# Patient Record
Sex: MALE | Race: WHITE | Employment: FULL TIME | URBAN - NONMETROPOLITAN AREA
[De-identification: names, ages, dates, MRNs, and addresses within clinical notes are randomized per-mention and may not be internally consistent; named-entity substitution may affect disease eponyms.]

---

## 2018-04-26 ENCOUNTER — HOSPITAL ENCOUNTER (OUTPATIENT)
Age: 48
Setting detail: SPECIMEN
Discharge: HOME | End: 2018-04-26
Payer: COMMERCIAL

## 2018-04-26 ENCOUNTER — HOSPITAL ENCOUNTER (OUTPATIENT)
Dept: CT IMAGING | Age: 48
Discharge: HOME | End: 2018-04-28
Payer: COMMERCIAL

## 2018-04-26 ENCOUNTER — HOSPITAL ENCOUNTER (OUTPATIENT)
Age: 48
End: 2018-04-26
Payer: COMMERCIAL

## 2018-04-26 DIAGNOSIS — R10.30 INGUINAL PAIN, UNSPECIFIED LATERALITY: ICD-10-CM

## 2018-04-26 DIAGNOSIS — F50.2 VOMITING ASSOCIATED WITH BULIMIA NERVOSA WITH NAUSEA: ICD-10-CM

## 2018-04-26 DIAGNOSIS — R19.7 DIARRHEA OF PRESUMED INFECTIOUS ORIGIN: ICD-10-CM

## 2018-04-26 DIAGNOSIS — R63.4 LOSS OF WEIGHT: ICD-10-CM

## 2018-04-26 LAB
ALBUMIN SERPL-MCNC: 4.1 G/DL (ref 3.5–5.2)
ALP BLD-CCNC: 71 U/L (ref 40–129)
ALT SERPL-CCNC: 58 U/L (ref 0–40)
AMYLASE: 26 U/L (ref 20–100)
ANION GAP SERPL CALCULATED.3IONS-SCNC: 17 MMOL/L (ref 7–16)
AST SERPL-CCNC: 67 U/L (ref 0–39)
BASOPHILS ABSOLUTE: 0.02 E9/L (ref 0–0.2)
BASOPHILS RELATIVE PERCENT: 0.3 % (ref 0–2)
BILIRUB SERPL-MCNC: 0.4 MG/DL (ref 0–1.2)
BUN BLDV-MCNC: 19 MG/DL (ref 6–20)
CALCIUM SERPL-MCNC: 9 MG/DL (ref 8.6–10.2)
CHLORIDE BLD-SCNC: 94 MMOL/L (ref 98–107)
CO2: 23 MMOL/L (ref 22–29)
CREAT SERPL-MCNC: 1.3 MG/DL (ref 0.7–1.2)
EOSINOPHILS ABSOLUTE: 0.01 E9/L (ref 0.05–0.5)
EOSINOPHILS RELATIVE PERCENT: 0.1 % (ref 0–6)
GFR AFRICAN AMERICAN: >60
GFR NON-AFRICAN AMERICAN: 59 ML/MIN/1.73
GLUCOSE BLD-MCNC: 122 MG/DL (ref 74–109)
HCT VFR BLD CALC: 49.3 % (ref 37–54)
HEMOGLOBIN: 17.5 G/DL (ref 12.5–16.5)
IMMATURE GRANULOCYTES #: 0.01 E9/L
IMMATURE GRANULOCYTES %: 0.1 % (ref 0–5)
LYMPHOCYTES ABSOLUTE: 2.4 E9/L (ref 1.5–4)
LYMPHOCYTES RELATIVE PERCENT: 33.9 % (ref 20–42)
MCH RBC QN AUTO: 31.8 PG (ref 26–35)
MCHC RBC AUTO-ENTMCNC: 35.5 % (ref 32–34.5)
MCV RBC AUTO: 89.6 FL (ref 80–99.9)
MONOCYTES ABSOLUTE: 0.88 E9/L (ref 0.1–0.95)
MONOCYTES RELATIVE PERCENT: 12.4 % (ref 2–12)
NEUTROPHILS ABSOLUTE: 3.75 E9/L (ref 1.8–7.3)
NEUTROPHILS RELATIVE PERCENT: 53.2 % (ref 43–80)
PDW BLD-RTO: 12.2 FL (ref 11.5–15)
PLATELET # BLD: 168 E9/L (ref 130–450)
PMV BLD AUTO: 9.9 FL (ref 7–12)
POTASSIUM SERPL-SCNC: 3.8 MMOL/L (ref 3.5–5)
RBC # BLD: 5.5 E12/L (ref 3.8–5.8)
SODIUM BLD-SCNC: 134 MMOL/L (ref 132–146)
TOTAL PROTEIN: 8.5 G/DL (ref 6.4–8.3)
WBC # BLD: 7.1 E9/L (ref 4.5–11.5)

## 2018-04-26 PROCEDURE — 80053 COMPREHEN METABOLIC PANEL: CPT

## 2018-04-26 PROCEDURE — 87425 ROTAVIRUS AG IA: CPT

## 2018-04-26 PROCEDURE — 87329 GIARDIA AG IA: CPT

## 2018-04-26 PROCEDURE — 82150 ASSAY OF AMYLASE: CPT

## 2018-04-26 PROCEDURE — 80061 LIPID PANEL: CPT

## 2018-04-26 PROCEDURE — 87324 CLOSTRIDIUM AG IA: CPT

## 2018-04-26 PROCEDURE — 36415 COLL VENOUS BLD VENIPUNCTURE: CPT

## 2018-04-26 PROCEDURE — 87045 FECES CULTURE AEROBIC BACT: CPT

## 2018-04-26 PROCEDURE — G0328 FECAL BLOOD SCRN IMMUNOASSAY: HCPCS

## 2018-04-26 PROCEDURE — 85025 COMPLETE CBC W/AUTO DIFF WBC: CPT

## 2018-04-26 PROCEDURE — 87798 DETECT AGENT NOS DNA AMP: CPT

## 2018-04-27 LAB
CHOLESTEROL, TOTAL: 160 MG/DL (ref 0–199)
HDLC SERPL-MCNC: 22 MG/DL
LDL CHOLESTEROL CALCULATED: 110 MG/DL (ref 0–99)
TRIGL SERPL-MCNC: 141 MG/DL (ref 0–149)
VLDLC SERPL CALC-MCNC: 28 MG/DL

## 2018-04-28 ENCOUNTER — HOSPITAL ENCOUNTER (OUTPATIENT)
Dept: CT IMAGING | Age: 48
Discharge: HOME | End: 2018-04-30
Payer: COMMERCIAL

## 2018-04-28 ENCOUNTER — HOSPITAL ENCOUNTER (OUTPATIENT)
Age: 48
Discharge: HOME | End: 2018-04-30
Payer: COMMERCIAL

## 2018-04-28 DIAGNOSIS — R11.2 NAUSEA AND VOMITING, INTRACTABILITY OF VOMITING NOT SPECIFIED, UNSPECIFIED VOMITING TYPE: ICD-10-CM

## 2018-04-28 DIAGNOSIS — R19.7 DIARRHEA, UNSPECIFIED TYPE: ICD-10-CM

## 2018-04-28 DIAGNOSIS — R10.30 ABDOMINAL PAIN, LOWER: ICD-10-CM

## 2018-04-28 DIAGNOSIS — R63.4 WEIGHT LOSS: ICD-10-CM

## 2018-04-28 LAB
C DIFFICILE TOXIN, EIA: NORMAL
OCCULT BLOOD SCREENING: NORMAL
ROTAVIRUS ANTIGEN: NORMAL

## 2018-04-28 PROCEDURE — 6360000004 HC RX CONTRAST MEDICATION: Performed by: RADIOLOGY

## 2018-04-28 RX ADMIN — IOPAMIDOL 100 ML: 755 INJECTION, SOLUTION INTRAVENOUS at 11:20

## 2018-04-28 RX ADMIN — IOHEXOL 50 ML: 240 INJECTION, SOLUTION INTRATHECAL; INTRAVASCULAR; INTRAVENOUS; ORAL at 09:50

## 2018-04-29 LAB — CULTURE, STOOL: NORMAL

## 2018-04-30 LAB — GIARDIA ANTIGEN STOOL: NORMAL

## 2018-05-14 LAB
Lab: NORMAL
REPORT: NORMAL
THIS TEST SENT TO: NORMAL

## 2018-10-29 ENCOUNTER — OFFICE VISIT (OUTPATIENT)
Dept: CARDIOLOGY CLINIC | Age: 48
End: 2018-10-29
Payer: COMMERCIAL

## 2018-10-29 VITALS
HEIGHT: 67 IN | BODY MASS INDEX: 49.44 KG/M2 | DIASTOLIC BLOOD PRESSURE: 80 MMHG | HEART RATE: 92 BPM | WEIGHT: 315 LBS | SYSTOLIC BLOOD PRESSURE: 130 MMHG

## 2018-10-29 DIAGNOSIS — R00.2 PALPITATIONS: Primary | ICD-10-CM

## 2018-10-29 DIAGNOSIS — I10 ESSENTIAL HYPERTENSION: ICD-10-CM

## 2018-10-29 DIAGNOSIS — G47.33 OBSTRUCTIVE SLEEP APNEA: ICD-10-CM

## 2018-10-29 DIAGNOSIS — E66.01 MORBID OBESITY (HCC): ICD-10-CM

## 2018-10-29 PROCEDURE — 93000 ELECTROCARDIOGRAM COMPLETE: CPT | Performed by: INTERNAL MEDICINE

## 2018-10-29 PROCEDURE — 99244 OFF/OP CNSLTJ NEW/EST MOD 40: CPT | Performed by: INTERNAL MEDICINE

## 2018-10-29 RX ORDER — AMLODIPINE BESYLATE 5 MG/1
5 TABLET ORAL DAILY
COMMUNITY

## 2018-10-29 NOTE — PROGRESS NOTES
identified will otherwise return him to your care. He will continue to benefit from aggressive risk factor modification of blood pressure and serum lipids in attempt to reduce risk of future atherosclerotic development. I would happily reevaluate him in the future should additional cardiovascular difficulties or concerns arise. Follow-up office visit based on arrhythmia monitoring results    Thank you for allowing me to participate in your patient's care. Please feel free to contact me if you have any questions or concerns. Note: This report was completed utilizing a computerized voice recognition software. Every effort has been made to insure accuracy, however; inadvertent computerized transcription errors may be present. Cherelle Mcclain.  Tracey Major, [address] [facility]    An electronic copy of this consult note was forwarded to Dr. Yuki Degroot
Upper respiratory infection

## 2018-11-01 ENCOUNTER — TELEPHONE (OUTPATIENT)
Dept: CARDIOLOGY CLINIC | Age: 48
End: 2018-11-01

## 2018-11-01 NOTE — TELEPHONE ENCOUNTER
Patient was scheduled to have a 7-day event monitor applied. This appointment was canceled because the patient is currently hospitalized.

## 2023-07-17 ENCOUNTER — HOSPITAL ENCOUNTER (OUTPATIENT)
Dept: DATA CONVERSION | Facility: HOSPITAL | Age: 53
End: 2023-07-17
Attending: INTERNAL MEDICINE | Admitting: INTERNAL MEDICINE

## 2023-07-17 DIAGNOSIS — K76.0 FATTY (CHANGE OF) LIVER, NOT ELSEWHERE CLASSIFIED: ICD-10-CM

## 2023-07-17 DIAGNOSIS — E11.9 TYPE 2 DIABETES MELLITUS WITHOUT COMPLICATIONS (MULTI): ICD-10-CM

## 2023-07-17 DIAGNOSIS — E66.9 OBESITY, UNSPECIFIED: ICD-10-CM

## 2023-07-17 DIAGNOSIS — E78.5 HYPERLIPIDEMIA, UNSPECIFIED: ICD-10-CM

## 2023-07-17 DIAGNOSIS — Z99.89 DEPENDENCE ON OTHER ENABLING MACHINES AND DEVICES: ICD-10-CM

## 2023-07-17 DIAGNOSIS — G47.33 OBSTRUCTIVE SLEEP APNEA (ADULT) (PEDIATRIC): ICD-10-CM

## 2023-07-17 DIAGNOSIS — D12.3 BENIGN NEOPLASM OF TRANSVERSE COLON: ICD-10-CM

## 2023-07-17 DIAGNOSIS — R19.5 OTHER FECAL ABNORMALITIES: ICD-10-CM

## 2023-07-17 DIAGNOSIS — K11.0 ATROPHY OF SALIVARY GLAND: ICD-10-CM

## 2023-07-17 DIAGNOSIS — K64.8 OTHER HEMORRHOIDS: ICD-10-CM

## 2023-07-17 LAB — POCT GLUCOSE: 172 MG/DL (ref 74–99)

## 2023-07-20 LAB
COMPLETE PATHOLOGY REPORT: NORMAL
CONVERTED CLINICAL DIAGNOSIS-HISTORY: NORMAL
CONVERTED FINAL DIAGNOSIS: NORMAL
CONVERTED FINAL REPORT PDF LINK TO COPY AND PASTE: NORMAL
CONVERTED GROSS DESCRIPTION: NORMAL

## 2023-09-29 VITALS
RESPIRATION RATE: 18 BRPM | DIASTOLIC BLOOD PRESSURE: 77 MMHG | TEMPERATURE: 97.2 F | SYSTOLIC BLOOD PRESSURE: 147 MMHG | HEART RATE: 67 BPM

## 2023-09-30 NOTE — H&P
History of Present Illness:   History Present Illness:  Reason for surgery: Positive Cologuard   HPI:    Patient is scheduled for diagnostic colonoscopy due to positive Cologuard.  Family history: Grandfather was diagnosed colon cancer.  No family history of colon cancer in first-degree relative    Allergies:        Allergies:  ·  No Known Allergies :     Home Medication Review:   Home Medications Reviewed: yes     Impression/Procedure:   ·  Impression and Planned Procedure: Colonoscopy       ERAS (Enhanced Recovery After Surgery):  ·  ERAS Patient: no       Vital Signs:  Temperature C: 36.2 degrees C   Temperature F: 97.1 degrees F   Heart Rate: 67 beats per minute   Respiratory Rate: 18 breath per minute   Blood Pressure Systolic: 147 mm/Hg   Blood Pressure Diastolic: 77 mm/Hg     Physical Exam by System:    Respiratory/Thorax: Patent airways, CTAB, normal  breath sounds with good chest expansion, thorax symmetric   Cardiovascular: Regular, rate and rhythm, no murmurs,  2+ equal pulses of the extremities, normal S 1and S 2     Consent:   COVID-19 Consent:  ·  COVID-19 Risk Consent Surgeon has reviewed key risks related to the risk of stacia COVID-19 and if they contract COVID-19 what the risks are.       Electronic Signatures:  Tin Klein)  (Signed 02-Aug-2023 14:36)   Authored: History of Present Illness, Allergies, Home  Medication Review, Impression/Procedure, ERAS, Physical Exam, Consent, Note Completion      Last Updated: 02-Aug-2023 14:36 by Tin Klein)

## 2024-04-27 ENCOUNTER — HOSPITAL ENCOUNTER (OUTPATIENT)
Dept: RADIOLOGY | Facility: HOSPITAL | Age: 54
Discharge: HOME | End: 2024-04-27
Payer: COMMERCIAL

## 2024-04-27 DIAGNOSIS — R22.0 LOCALIZED SWELLING, MASS AND LUMP, HEAD: ICD-10-CM

## 2024-04-27 PROCEDURE — 76536 US EXAM OF HEAD AND NECK: CPT

## 2024-04-27 PROCEDURE — 76536 US EXAM OF HEAD AND NECK: CPT | Performed by: STUDENT IN AN ORGANIZED HEALTH CARE EDUCATION/TRAINING PROGRAM

## 2024-05-10 ENCOUNTER — OFFICE VISIT (OUTPATIENT)
Dept: SURGERY | Facility: CLINIC | Age: 54
End: 2024-05-10
Payer: COMMERCIAL

## 2024-05-10 VITALS
OXYGEN SATURATION: 95 % | HEART RATE: 74 BPM | HEIGHT: 66 IN | SYSTOLIC BLOOD PRESSURE: 121 MMHG | WEIGHT: 281 LBS | BODY MASS INDEX: 45.16 KG/M2 | DIASTOLIC BLOOD PRESSURE: 77 MMHG

## 2024-05-10 DIAGNOSIS — R22.0 OCCIPITAL MASS: Primary | ICD-10-CM

## 2024-05-10 PROCEDURE — 99203 OFFICE O/P NEW LOW 30 MIN: CPT | Performed by: SURGERY

## 2024-05-10 NOTE — PROGRESS NOTES
General Surgery History and Physical    Referring Provider:  DO Itzel Alvarado Henry D, DO     Chief Complaint:  Posterior scalp mass    History of Present Illness:  This is a 54 y.o. male who presents with concerns for a small subcutaneous mass on his right occipital scalp.  He reports that he would occasionally have some intermittent discomfort in this area for many years, but 1 month ago his wife noticed an asymmetry with more of a bulge on the right occipital scalp compared to the left.  They deny any change in the size or appearance since then.  They deny any drainage.    Past Medical History:  Morbid obesity  Prediabetes    Past Surgical History:  Vasectomy  Knee surgery  Right shoulder surgery    Medications:  Ozempic    Allergies:  No known drug allergies    Family History:  Patient is adopted and does not know of any family history    Social History:  .  Works as a cook.  Denies tobacco and illicits.  Drinks alcohol socially.       Review of Systems:  A complete 12 point review of systems was performed and is negative except as noted in the history of present illness.      Vital Signs:  Vitals:    05/10/24 1047   BP: 121/77   Pulse: 74   SpO2: 95%          Physical Exam:  General: No acute distress. Sitting up in bed.   Neuro: Alert and oriented ×3. Follows commands.  Head: Atraumatic.  There is a slightly asymmetric fullness in the right occipital scalp compared to the left.  However, there is no well-circumscribed mass that can be palpated.  Eyes: Pupils equal reactive to light. Extraocular motions intact.  Ears: Hears normal speaking voice.  Mouth, Nose, Throat: Mucous membranes moist.  Normal dentition.  Neck: Supple. No appreciable masses.  Chest: No crepitus.  No appreciable scars.  Heart: Regular rate and rhythm.  Lung: Clear to auscultation bilaterally.  Vascular: No carotid bruits.  Palpable radial pulses bilaterally.  Abdomen: Soft. Nondistended. Nontender.    Musculoskeletal:  Moves all extremities.  Normal range of motion.  Lymphatic: No palpable lymph nodes.  Skin: No rashes or lesions.  Psychological: Normal affect      Laboratory Values:  None        Imaging:  I have personally reviewed the images and the radiologist's report.  Ultrasound: No obvious mass.       Assessment:  This is a 54 y.o. male who presents with concerns for a mass in his right occipital scalp.  We discussed at length that there is a mild asymmetry with prominence of the right occipital scalp compared to the left, but I cannot appreciate an obvious cyst or mass that is amenable to excision.  We discussed that ultrasound also does not show any obvious mass or cyst.  We discussed that at this time I would recommend watchful waiting.  We discussed that if in the future a well-circumscribed mass appears, we can discuss surgical excision at that time.      Plan:  -- Follow up as needed.      Jose Arteaga MD  General Surgery  Office: [phone]  Fax:     [phone]  10:50 AM   05/10/24

## 2024-11-09 ENCOUNTER — APPOINTMENT (OUTPATIENT)
Dept: RADIOLOGY | Facility: HOSPITAL | Age: 54
End: 2024-11-09
Payer: COMMERCIAL

## 2024-11-09 ENCOUNTER — HOSPITAL ENCOUNTER (INPATIENT)
Facility: HOSPITAL | Age: 54
End: 2024-11-09
Attending: INTERNAL MEDICINE | Admitting: INTERNAL MEDICINE
Payer: COMMERCIAL

## 2024-11-09 ENCOUNTER — HOSPITAL ENCOUNTER (EMERGENCY)
Facility: HOSPITAL | Age: 54
Discharge: STILL ADMITTED OR ADMINISTRATIVE | End: 2024-11-09
Attending: EMERGENCY MEDICINE
Payer: COMMERCIAL

## 2024-11-09 VITALS
TEMPERATURE: 98.1 F | BODY MASS INDEX: 44.52 KG/M2 | RESPIRATION RATE: 18 BRPM | SYSTOLIC BLOOD PRESSURE: 130 MMHG | HEIGHT: 66 IN | OXYGEN SATURATION: 95 % | WEIGHT: 277 LBS | DIASTOLIC BLOOD PRESSURE: 85 MMHG | HEART RATE: 75 BPM

## 2024-11-09 DIAGNOSIS — L03.115 CELLULITIS OF RIGHT FOOT: ICD-10-CM

## 2024-11-09 DIAGNOSIS — M86.9 OSTEOMYELITIS: Primary | ICD-10-CM

## 2024-11-09 DIAGNOSIS — M86.9 OSTEOMYELITIS OF GREAT TOE OF RIGHT FOOT (MULTI): Primary | ICD-10-CM

## 2024-11-09 DIAGNOSIS — E11.9 TYPE 2 DIABETES MELLITUS WITHOUT COMPLICATION, WITHOUT LONG-TERM CURRENT USE OF INSULIN (MULTI): ICD-10-CM

## 2024-11-09 LAB
ANION GAP SERPL CALC-SCNC: 9 MMOL/L (ref 10–20)
BASOPHILS # BLD AUTO: 0.04 X10*3/UL (ref 0–0.1)
BASOPHILS NFR BLD AUTO: 0.4 %
BUN SERPL-MCNC: 9 MG/DL (ref 6–23)
CALCIUM SERPL-MCNC: 8.9 MG/DL (ref 8.6–10.3)
CHLORIDE SERPL-SCNC: 101 MMOL/L (ref 98–107)
CO2 SERPL-SCNC: 27 MMOL/L (ref 21–32)
CREAT SERPL-MCNC: 1.03 MG/DL (ref 0.5–1.3)
CRP SERPL-MCNC: 8.89 MG/DL
EGFRCR SERPLBLD CKD-EPI 2021: 87 ML/MIN/1.73M*2
EOSINOPHIL # BLD AUTO: 0.1 X10*3/UL (ref 0–0.7)
EOSINOPHIL NFR BLD AUTO: 1 %
ERYTHROCYTE [DISTWIDTH] IN BLOOD BY AUTOMATED COUNT: 12.1 % (ref 11.5–14.5)
ERYTHROCYTE [SEDIMENTATION RATE] IN BLOOD BY WESTERGREN METHOD: 10 MM/H (ref 0–20)
GLUCOSE BLD MANUAL STRIP-MCNC: 114 MG/DL (ref 74–99)
GLUCOSE SERPL-MCNC: 130 MG/DL (ref 74–99)
HCT VFR BLD AUTO: 41.5 % (ref 41–52)
HGB BLD-MCNC: 14.4 G/DL (ref 13.5–17.5)
IMM GRANULOCYTES # BLD AUTO: 0.03 X10*3/UL (ref 0–0.7)
IMM GRANULOCYTES NFR BLD AUTO: 0.3 % (ref 0–0.9)
LACTATE SERPL-SCNC: 1.1 MMOL/L (ref 0.4–2)
LYMPHOCYTES # BLD AUTO: 1.58 X10*3/UL (ref 1.2–4.8)
LYMPHOCYTES NFR BLD AUTO: 16 %
MAGNESIUM SERPL-MCNC: 1.85 MG/DL (ref 1.6–2.4)
MCH RBC QN AUTO: 32.4 PG (ref 26–34)
MCHC RBC AUTO-ENTMCNC: 34.7 G/DL (ref 32–36)
MCV RBC AUTO: 93 FL (ref 80–100)
MONOCYTES # BLD AUTO: 0.78 X10*3/UL (ref 0.1–1)
MONOCYTES NFR BLD AUTO: 7.9 %
NEUTROPHILS # BLD AUTO: 7.32 X10*3/UL (ref 1.2–7.7)
NEUTROPHILS NFR BLD AUTO: 74.4 %
NRBC BLD-RTO: 0 /100 WBCS (ref 0–0)
PLATELET # BLD AUTO: 275 X10*3/UL (ref 150–450)
POTASSIUM SERPL-SCNC: 3.8 MMOL/L (ref 3.5–5.3)
RBC # BLD AUTO: 4.45 X10*6/UL (ref 4.5–5.9)
SODIUM SERPL-SCNC: 133 MMOL/L (ref 136–145)
WBC # BLD AUTO: 9.9 X10*3/UL (ref 4.4–11.3)

## 2024-11-09 PROCEDURE — 73590 X-RAY EXAM OF LOWER LEG: CPT | Mod: RIGHT SIDE | Performed by: RADIOLOGY

## 2024-11-09 PROCEDURE — 82947 ASSAY GLUCOSE BLOOD QUANT: CPT

## 2024-11-09 PROCEDURE — 73630 X-RAY EXAM OF FOOT: CPT | Mod: RT

## 2024-11-09 PROCEDURE — 1100000001 HC PRIVATE ROOM DAILY

## 2024-11-09 PROCEDURE — 83735 ASSAY OF MAGNESIUM: CPT | Performed by: EMERGENCY MEDICINE

## 2024-11-09 PROCEDURE — 86140 C-REACTIVE PROTEIN: CPT | Performed by: EMERGENCY MEDICINE

## 2024-11-09 PROCEDURE — 73630 X-RAY EXAM OF FOOT: CPT | Mod: RIGHT SIDE | Performed by: RADIOLOGY

## 2024-11-09 PROCEDURE — 36415 COLL VENOUS BLD VENIPUNCTURE: CPT | Performed by: EMERGENCY MEDICINE

## 2024-11-09 PROCEDURE — 87040 BLOOD CULTURE FOR BACTERIA: CPT | Mod: GENLAB | Performed by: EMERGENCY MEDICINE

## 2024-11-09 PROCEDURE — 96366 THER/PROPH/DIAG IV INF ADDON: CPT

## 2024-11-09 PROCEDURE — 96367 TX/PROPH/DG ADDL SEQ IV INF: CPT

## 2024-11-09 PROCEDURE — 99223 1ST HOSP IP/OBS HIGH 75: CPT

## 2024-11-09 PROCEDURE — 82310 ASSAY OF CALCIUM: CPT | Performed by: EMERGENCY MEDICINE

## 2024-11-09 PROCEDURE — 2500000004 HC RX 250 GENERAL PHARMACY W/ HCPCS (ALT 636 FOR OP/ED)

## 2024-11-09 PROCEDURE — 83605 ASSAY OF LACTIC ACID: CPT | Performed by: EMERGENCY MEDICINE

## 2024-11-09 PROCEDURE — 85652 RBC SED RATE AUTOMATED: CPT | Performed by: EMERGENCY MEDICINE

## 2024-11-09 PROCEDURE — 73590 X-RAY EXAM OF LOWER LEG: CPT | Mod: RT

## 2024-11-09 PROCEDURE — 96365 THER/PROPH/DIAG IV INF INIT: CPT

## 2024-11-09 PROCEDURE — 99285 EMERGENCY DEPT VISIT HI MDM: CPT | Mod: 25

## 2024-11-09 PROCEDURE — 2500000004 HC RX 250 GENERAL PHARMACY W/ HCPCS (ALT 636 FOR OP/ED): Performed by: EMERGENCY MEDICINE

## 2024-11-09 PROCEDURE — 2500000005 HC RX 250 GENERAL PHARMACY W/O HCPCS

## 2024-11-09 PROCEDURE — 85025 COMPLETE CBC W/AUTO DIFF WBC: CPT | Performed by: EMERGENCY MEDICINE

## 2024-11-09 RX ORDER — VANCOMYCIN 2 G/400ML
2000 INJECTION, SOLUTION INTRAVENOUS ONCE
Status: COMPLETED | OUTPATIENT
Start: 2024-11-09 | End: 2024-11-09

## 2024-11-09 RX ORDER — OXYCODONE HYDROCHLORIDE 5 MG/1
5 TABLET ORAL EVERY 6 HOURS PRN
Status: DISPENSED | OUTPATIENT
Start: 2024-11-09

## 2024-11-09 RX ORDER — OXYCODONE HYDROCHLORIDE 10 MG/1
10 TABLET ORAL EVERY 6 HOURS PRN
Status: ACTIVE | OUTPATIENT
Start: 2024-11-09

## 2024-11-09 RX ORDER — POLYETHYLENE GLYCOL 3350 17 G/17G
17 POWDER, FOR SOLUTION ORAL DAILY PRN
Status: ACTIVE | OUTPATIENT
Start: 2024-11-09

## 2024-11-09 RX ORDER — SULFAMETHOXAZOLE AND TRIMETHOPRIM 800; 160 MG/1; MG/1
1 TABLET ORAL 2 TIMES DAILY
Status: ON HOLD | COMMUNITY
Start: 2024-08-14 | End: 2024-11-09 | Stop reason: WASHOUT

## 2024-11-09 RX ORDER — ENOXAPARIN SODIUM 100 MG/ML
40 INJECTION SUBCUTANEOUS EVERY 12 HOURS SCHEDULED
Status: DISCONTINUED | OUTPATIENT
Start: 2024-11-09 | End: 2024-11-10

## 2024-11-09 RX ORDER — SEMAGLUTIDE 1.34 MG/ML
1 INJECTION, SOLUTION SUBCUTANEOUS
Status: ON HOLD | COMMUNITY

## 2024-11-09 RX ORDER — VANCOMYCIN HYDROCHLORIDE 1 G/20ML
INJECTION, POWDER, LYOPHILIZED, FOR SOLUTION INTRAVENOUS DAILY PRN
Status: DISPENSED | OUTPATIENT
Start: 2024-11-09

## 2024-11-09 RX ORDER — INSULIN LISPRO 100 [IU]/ML
0-10 INJECTION, SOLUTION INTRAVENOUS; SUBCUTANEOUS
Status: ACTIVE | OUTPATIENT
Start: 2024-11-09

## 2024-11-09 RX ORDER — ACETAMINOPHEN 325 MG/1
650 TABLET ORAL EVERY 6 HOURS PRN
Status: ACTIVE | OUTPATIENT
Start: 2024-11-09

## 2024-11-09 RX ADMIN — PIPERACILLIN SODIUM AND TAZOBACTAM SODIUM 3.38 G: 3; .375 INJECTION, SOLUTION INTRAVENOUS at 23:53

## 2024-11-09 RX ADMIN — VANCOMYCIN 2000 MG: 2 INJECTION, SOLUTION INTRAVENOUS at 10:51

## 2024-11-09 RX ADMIN — VANCOMYCIN HYDROCHLORIDE 1500 MG: 5 INJECTION, POWDER, LYOPHILIZED, FOR SOLUTION INTRAVENOUS at 22:19

## 2024-11-09 RX ADMIN — AMPICILLIN AND SULBACTAM 3 G: 2; 1 INJECTION, POWDER, FOR SOLUTION INTRAMUSCULAR; INTRAVENOUS at 10:16

## 2024-11-09 RX ADMIN — PIPERACILLIN SODIUM AND TAZOBACTAM SODIUM 3.38 G: 3; .375 INJECTION, SOLUTION INTRAVENOUS at 17:44

## 2024-11-09 RX ADMIN — ENOXAPARIN SODIUM 40 MG: 40 INJECTION SUBCUTANEOUS at 20:45

## 2024-11-09 SDOH — SOCIAL STABILITY: SOCIAL INSECURITY: WITHIN THE LAST YEAR, HAVE YOU BEEN AFRAID OF YOUR PARTNER OR EX-PARTNER?: NO

## 2024-11-09 SDOH — SOCIAL STABILITY: SOCIAL INSECURITY: DO YOU FEEL ANYONE HAS EXPLOITED OR TAKEN ADVANTAGE OF YOU FINANCIALLY OR OF YOUR PERSONAL PROPERTY?: NO

## 2024-11-09 SDOH — ECONOMIC STABILITY: HOUSING INSECURITY: IN THE PAST 12 MONTHS, HOW MANY TIMES HAVE YOU MOVED WHERE YOU WERE LIVING?: 1

## 2024-11-09 SDOH — SOCIAL STABILITY: SOCIAL INSECURITY: ARE YOU OR HAVE YOU BEEN THREATENED OR ABUSED PHYSICALLY, EMOTIONALLY, OR SEXUALLY BY ANYONE?: NO

## 2024-11-09 SDOH — ECONOMIC STABILITY: HOUSING INSECURITY: IN THE LAST 12 MONTHS, WAS THERE A TIME WHEN YOU WERE NOT ABLE TO PAY THE MORTGAGE OR RENT ON TIME?: NO

## 2024-11-09 SDOH — SOCIAL STABILITY: SOCIAL INSECURITY: HAVE YOU HAD THOUGHTS OF HARMING ANYONE ELSE?: NO

## 2024-11-09 SDOH — SOCIAL STABILITY: SOCIAL INSECURITY
WITHIN THE LAST YEAR, HAVE YOU BEEN RAPED OR FORCED TO HAVE ANY KIND OF SEXUAL ACTIVITY BY YOUR PARTNER OR EX-PARTNER?: NO

## 2024-11-09 SDOH — ECONOMIC STABILITY: INCOME INSECURITY: IN THE PAST 12 MONTHS HAS THE ELECTRIC, GAS, OIL, OR WATER COMPANY THREATENED TO SHUT OFF SERVICES IN YOUR HOME?: NO

## 2024-11-09 SDOH — ECONOMIC STABILITY: FOOD INSECURITY: WITHIN THE PAST 12 MONTHS, THE FOOD YOU BOUGHT JUST DIDN'T LAST AND YOU DIDN'T HAVE MONEY TO GET MORE.: NEVER TRUE

## 2024-11-09 SDOH — SOCIAL STABILITY: SOCIAL INSECURITY
WITHIN THE LAST YEAR, HAVE YOU BEEN KICKED, HIT, SLAPPED, OR OTHERWISE PHYSICALLY HURT BY YOUR PARTNER OR EX-PARTNER?: NO

## 2024-11-09 SDOH — ECONOMIC STABILITY: HOUSING INSECURITY: AT ANY TIME IN THE PAST 12 MONTHS, WERE YOU HOMELESS OR LIVING IN A SHELTER (INCLUDING NOW)?: NO

## 2024-11-09 SDOH — SOCIAL STABILITY: SOCIAL INSECURITY: WITHIN THE LAST YEAR, HAVE YOU BEEN HUMILIATED OR EMOTIONALLY ABUSED IN OTHER WAYS BY YOUR PARTNER OR EX-PARTNER?: NO

## 2024-11-09 SDOH — SOCIAL STABILITY: SOCIAL INSECURITY: ARE THERE ANY APPARENT SIGNS OF INJURIES/BEHAVIORS THAT COULD BE RELATED TO ABUSE/NEGLECT?: NO

## 2024-11-09 SDOH — ECONOMIC STABILITY: FOOD INSECURITY: HOW HARD IS IT FOR YOU TO PAY FOR THE VERY BASICS LIKE FOOD, HOUSING, MEDICAL CARE, AND HEATING?: NOT HARD AT ALL

## 2024-11-09 SDOH — HEALTH STABILITY: MENTAL HEALTH: HOW OFTEN DO YOU HAVE SIX OR MORE DRINKS ON ONE OCCASION?: NEVER

## 2024-11-09 SDOH — HEALTH STABILITY: MENTAL HEALTH: HOW OFTEN DO YOU HAVE A DRINK CONTAINING ALCOHOL?: NEVER

## 2024-11-09 SDOH — SOCIAL STABILITY: SOCIAL INSECURITY: ABUSE: ADULT

## 2024-11-09 SDOH — HEALTH STABILITY: MENTAL HEALTH: HOW MANY DRINKS CONTAINING ALCOHOL DO YOU HAVE ON A TYPICAL DAY WHEN YOU ARE DRINKING?: PATIENT DOES NOT DRINK

## 2024-11-09 SDOH — SOCIAL STABILITY: SOCIAL INSECURITY: WERE YOU ABLE TO COMPLETE ALL THE BEHAVIORAL HEALTH SCREENINGS?: YES

## 2024-11-09 SDOH — ECONOMIC STABILITY: FOOD INSECURITY: WITHIN THE PAST 12 MONTHS, YOU WORRIED THAT YOUR FOOD WOULD RUN OUT BEFORE YOU GOT THE MONEY TO BUY MORE.: NEVER TRUE

## 2024-11-09 SDOH — ECONOMIC STABILITY: TRANSPORTATION INSECURITY: IN THE PAST 12 MONTHS, HAS LACK OF TRANSPORTATION KEPT YOU FROM MEDICAL APPOINTMENTS OR FROM GETTING MEDICATIONS?: NO

## 2024-11-09 SDOH — SOCIAL STABILITY: SOCIAL INSECURITY: DO YOU FEEL UNSAFE GOING BACK TO THE PLACE WHERE YOU ARE LIVING?: NO

## 2024-11-09 SDOH — SOCIAL STABILITY: SOCIAL INSECURITY: HAS ANYONE EVER THREATENED TO HURT YOUR FAMILY OR YOUR PETS?: NO

## 2024-11-09 SDOH — SOCIAL STABILITY: SOCIAL INSECURITY: HAVE YOU HAD ANY THOUGHTS OF HARMING ANYONE ELSE?: NO

## 2024-11-09 SDOH — SOCIAL STABILITY: SOCIAL INSECURITY: DOES ANYONE TRY TO KEEP YOU FROM HAVING/CONTACTING OTHER FRIENDS OR DOING THINGS OUTSIDE YOUR HOME?: NO

## 2024-11-09 ASSESSMENT — ACTIVITIES OF DAILY LIVING (ADL)
PATIENT'S MEMORY ADEQUATE TO SAFELY COMPLETE DAILY ACTIVITIES?: YES
DRESSING YOURSELF: INDEPENDENT
TOILETING: INDEPENDENT
ADEQUATE_TO_COMPLETE_ADL: YES
HEARING - RIGHT EAR: FUNCTIONAL
GROOMING: INDEPENDENT
BATHING: INDEPENDENT
FEEDING YOURSELF: INDEPENDENT
JUDGMENT_ADEQUATE_SAFELY_COMPLETE_DAILY_ACTIVITIES: YES
WALKS IN HOME: INDEPENDENT
HEARING - LEFT EAR: FUNCTIONAL
LACK_OF_TRANSPORTATION: NO

## 2024-11-09 ASSESSMENT — COGNITIVE AND FUNCTIONAL STATUS - GENERAL
DAILY ACTIVITIY SCORE: 24
PATIENT BASELINE BEDBOUND: NO
MOBILITY SCORE: 24
DAILY ACTIVITIY SCORE: 24
MOBILITY SCORE: 24

## 2024-11-09 ASSESSMENT — PAIN SCALES - GENERAL
PAINLEVEL_OUTOF10: 10 - WORST POSSIBLE PAIN
PAINLEVEL_OUTOF10: 0 - NO PAIN

## 2024-11-09 ASSESSMENT — ENCOUNTER SYMPTOMS
FEVER: 0
COUGH: 0
CHILLS: 0
BLOOD IN STOOL: 0
DIFFICULTY URINATING: 0
SHORTNESS OF BREATH: 0
HEADACHES: 0
DYSURIA: 0
CONSTIPATION: 0
RHINORRHEA: 0
ABDOMINAL PAIN: 0
SORE THROAT: 0
HEMATURIA: 0
VOMITING: 0
LIGHT-HEADEDNESS: 0
DIARRHEA: 0
NAUSEA: 0

## 2024-11-09 ASSESSMENT — PATIENT HEALTH QUESTIONNAIRE - PHQ9
SUM OF ALL RESPONSES TO PHQ9 QUESTIONS 1 & 2: 0
2. FEELING DOWN, DEPRESSED OR HOPELESS: NOT AT ALL
1. LITTLE INTEREST OR PLEASURE IN DOING THINGS: NOT AT ALL

## 2024-11-09 ASSESSMENT — LIFESTYLE VARIABLES
AUDIT-C TOTAL SCORE: 0
SKIP TO QUESTIONS 9-10: 1

## 2024-11-09 ASSESSMENT — PAIN DESCRIPTION - LOCATION: LOCATION: TOE (COMMENT WHICH ONE)

## 2024-11-09 ASSESSMENT — COLUMBIA-SUICIDE SEVERITY RATING SCALE - C-SSRS
1. IN THE PAST MONTH, HAVE YOU WISHED YOU WERE DEAD OR WISHED YOU COULD GO TO SLEEP AND NOT WAKE UP?: NO
6. HAVE YOU EVER DONE ANYTHING, STARTED TO DO ANYTHING, OR PREPARED TO DO ANYTHING TO END YOUR LIFE?: NO
6. HAVE YOU EVER DONE ANYTHING, STARTED TO DO ANYTHING, OR PREPARED TO DO ANYTHING TO END YOUR LIFE?: NO
2. HAVE YOU ACTUALLY HAD ANY THOUGHTS OF KILLING YOURSELF?: NO
2. HAVE YOU ACTUALLY HAD ANY THOUGHTS OF KILLING YOURSELF?: NO
1. IN THE PAST MONTH, HAVE YOU WISHED YOU WERE DEAD OR WISHED YOU COULD GO TO SLEEP AND NOT WAKE UP?: NO

## 2024-11-09 ASSESSMENT — PAIN DESCRIPTION - ORIENTATION: ORIENTATION: RIGHT

## 2024-11-09 ASSESSMENT — PAIN - FUNCTIONAL ASSESSMENT
PAIN_FUNCTIONAL_ASSESSMENT: 0-10

## 2024-11-09 NOTE — ASSESSMENT & PLAN NOTE
Twan Camacho is a 54 y.o. male with PMH of Type 2 DM, HTN, HLD, and presenting as a transfer from [place] ED for concern of right toe osteomyelitis 2/2 T2DM.    Acute Medical Issues:  #Right toe and foot cellulitis c/b right toe osteomyelitis  - hemodynamically stable  - CRP 8.89  - Xray of right foot with destruction and fragmentation of distal 1st digit phalanx  PLAN:  - Continue IV Vanc and zosyn  - Podiatry consulted, likely OR tomorrow with Dr. Gillespie  - ID consulted  - MR right foot ordered    Chronic Medical Issues:  #T2DM (well-controlled): A1C 5.8 in August 2024. Holding home ozempic. Started SSI  #possible HLD and HTN: patient denies both; not on any meds

## 2024-11-09 NOTE — H&P
History Of Present Illness  Twan Camacho is a 54 y.o. male with PMH of well-controlled Type 2 DM presenting as a transfer from [place] ED for concern of right toe osteomyelitis.     He presented to [place] ED 11/9 for worsening right great toe and foot swelling over the last two days. He has had two episodes of cellulitis of right foot and toe in April 2024 and August 2024 and had been previously prescribed 10 day course of bactrim. Two days ago, he noticed increasing swelling and redness of right great toe and started taking bactrim (x 2 days), however the redness and swelling continued to spread to lower ankles which prompted him to go to ED. Upon arrival, vitals were stable. CBC and CMP were unremarkable. CRP 8.89. ESR and Lactate in normal range. Blood cultures were obtained. X-ray of right foot with destruction and fragmentation of distal 1st digit phalanx. X-ray of right tibia fibula showed a calcaneal enthesophyte and heterotopic ossification in calf, but no osseous destruction or fracture. He was started on IV zosyn and vancomycin. Dr. Gillespie was made aware of patient. He was transferred to [facility] and admitted to floors for possible surgical intervention.     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 He follows with [facility] podiatry in [place] for the past year, but hasn't seen them in 2 months as he had no issues. His right great toe and foot gets edematous occasionally, but it usually subsides with 1-2 days of ice and elevation. He was diagnosed with diabetes in 2018, and it has been well-controlled on ozempic. A1C was 5.8 in August 2024.      Past Medical History  Past Medical History:   Diagnosis Date    Personal history of other endocrine, nutritional and metabolic disease     History of obesity       Surgical History  No past surgical history on file.  Right knee anterior cruciate ligament reconstruction 1998  Right shoulder tear 2006  Social  History  He reports that he has never smoked. He has never used smokeless tobacco. He reports that he does not drink alcohol and does not use drugs.  Former smoker. Quit over 20 years ago. 1-1.5 ppd for 8 years. Currently uses chewing tobacco  He does not drink alcohol.  No drug use.  Family History  No family history on file.  HLD - father  Colon cancer- paternal grandfather, diagnosed at age ~70  Allergies  Patient has no known allergies.    Review of Systems   Constitutional:  Negative for chills and fever.   HENT:  Negative for congestion, rhinorrhea and sore throat.    Respiratory:  Negative for cough and shortness of breath.    Cardiovascular:  Negative for chest pain.   Gastrointestinal:  Negative for abdominal pain, blood in stool, constipation, diarrhea, nausea and vomiting.   Genitourinary:  Negative for difficulty urinating, dysuria, hematuria and urgency.   Neurological:  Negative for light-headedness and headaches.        Physical Exam  Constitutional:       General: He is not in acute distress.  Cardiovascular:      Rate and Rhythm: Normal rate and regular rhythm.      Heart sounds: No murmur heard.  Pulmonary:      Effort: Pulmonary effort is normal. No respiratory distress.      Breath sounds: Normal breath sounds.   Abdominal:      General: Abdomen is flat. Bowel sounds are normal. There is no distension.      Tenderness: There is no abdominal tenderness.   Musculoskeletal:      Right lower leg: Edema present.      Left lower leg: No edema.      Comments: Right great toe with significant edema and redness, no obvious wound or skin opening  Erythema and edema of right foot up to mid-shin  Nontender to palpation (patient with significant neuropathy)    Varicose veins bilaterally   Skin:     General: Skin is warm and dry.   Neurological:      General: No focal deficit present.      Mental Status: He is alert and oriented to person, place, and time.          Last Recorded Vitals  Blood pressure 136/75,  "pulse 97, temperature 36.7 °C (98.1 °F), temperature source Temporal, resp. rate 16, height 1.676 m (5' 5.98\"), weight 126 kg (277 lb 12.5 oz), SpO2 98%.    Relevant Results  Scheduled medications  enoxaparin, 40 mg, subcutaneous, q12h VANITA  insulin lispro, 0-10 Units, subcutaneous, Before meals & nightly  piperacillin-tazobactam, 3.375 g, intravenous, q6h  vancomycin, 1,500 mg, intravenous, q12h      Continuous medications     PRN medications  PRN medications: acetaminophen, oxyCODONE, oxyCODONE, polyethylene glycol, vancomycin  Results for orders placed or performed during the hospital encounter of 11/09/24 (from the past 24 hours)   CBC and Auto Differential   Result Value Ref Range    WBC 9.9 4.4 - 11.3 x10*3/uL    nRBC 0.0 0.0 - 0.0 /100 WBCs    RBC 4.45 (L) 4.50 - 5.90 x10*6/uL    Hemoglobin 14.4 13.5 - 17.5 g/dL    Hematocrit 41.5 41.0 - 52.0 %    MCV 93 80 - 100 fL    MCH 32.4 26.0 - 34.0 pg    MCHC 34.7 32.0 - 36.0 g/dL    RDW 12.1 11.5 - 14.5 %    Platelets 275 150 - 450 x10*3/uL    Neutrophils % 74.4 40.0 - 80.0 %    Immature Granulocytes %, Automated 0.3 0.0 - 0.9 %    Lymphocytes % 16.0 13.0 - 44.0 %    Monocytes % 7.9 2.0 - 10.0 %    Eosinophils % 1.0 0.0 - 6.0 %    Basophils % 0.4 0.0 - 2.0 %    Neutrophils Absolute 7.32 1.20 - 7.70 x10*3/uL    Immature Granulocytes Absolute, Automated 0.03 0.00 - 0.70 x10*3/uL    Lymphocytes Absolute 1.58 1.20 - 4.80 x10*3/uL    Monocytes Absolute 0.78 0.10 - 1.00 x10*3/uL    Eosinophils Absolute 0.10 0.00 - 0.70 x10*3/uL    Basophils Absolute 0.04 0.00 - 0.10 x10*3/uL   Magnesium   Result Value Ref Range    Magnesium 1.85 1.60 - 2.40 mg/dL   Basic metabolic panel   Result Value Ref Range    Glucose 130 (H) 74 - 99 mg/dL    Sodium 133 (L) 136 - 145 mmol/L    Potassium 3.8 3.5 - 5.3 mmol/L    Chloride 101 98 - 107 mmol/L    Bicarbonate 27 21 - 32 mmol/L    Anion Gap 9 (L) 10 - 20 mmol/L    Urea Nitrogen 9 6 - 23 mg/dL    Creatinine 1.03 0.50 - 1.30 mg/dL    eGFR 87 >60 " mL/min/1.73m*2    Calcium 8.9 8.6 - 10.3 mg/dL   Lactate   Result Value Ref Range    Lactate 1.1 0.4 - 2.0 mmol/L   C-Reactive Protein   Result Value Ref Range    C-Reactive Protein 8.89 (H) <1.00 mg/dL   Sedimentation rate, automated   Result Value Ref Range    Sedimentation Rate 10 0 - 20 mm/h     XR tibia fibula right 2 views    Result Date: 11/9/2024  Interpreted By:  Johnathon Perry, STUDY: XR TIBIA FIBULA RIGHT 2 VIEWS; 11/9/2024 10:19 am   INDICATION: Signs/Symptoms:right foot/leg red and swollen.   COMPARISON: None.   ACCESSION NUMBER(S): [id number]   ORDERING CLINICIAN: DOLORES JADE   TECHNIQUE: Two views of the right tibia and fibula   FINDINGS: No fracture or dislocation is evident. Surgical and degenerative changes seen of the knee. There is calcaneal enthesophyte formation. Varicose veins are seen in the subcutaneous tissues. There is some heterotopic ossification in the calf. Vascular calcifications are also seen in the soft tissues. No soft tissue gas is evident.       As above without osseous injury evident.   Signed by: Johnathon Perry 11/9/2024 10:35 AM Dictation workstation:   [id number]    XR foot right 3+ views    Result Date: 11/9/2024  Interpreted By:  Johnathon Perry, STUDY: Right foot dated  11/9/2024.   INDICATION: Signs/Symptoms:right foot and Great toe red and swollen   COMPARISON: Radiographs dated 04/02/2023.   ACCESSION NUMBER(S): [id number]   ORDERING CLINICIAN: DOLORES JADE   TECHNIQUE: Three views of the  right foot.   FINDINGS: There is destruction the majority of the tuft and diaphysis of the distal phalanx of the 1st digit with comminuted fragmentation and some destruction of the base.  There is an os naviculare. Degenerative changes seen of the midfoot. There is calcaneal enthesophyte formation.   No ankle joint effusion is evident. Soft tissue swelling is seen of the 1st digit.       Findings most compatible with cellulitis with osteomyelitis with destruction of the  distal phalanx of the 1st digit with fragmentation of the proximal portion of the distal phalanx. MRI may be helpful to further characterize extent of process.   MACRO: None   Signed by: Johnathon Perry 11/9/2024 10:34 AM Dictation workstation:   [id number]        Assessment/Plan   Assessment & Plan  Osteomyelitis    Twan Camacho is a 54 y.o. male with PMH of Type 2 DM, HTN, HLD, and presenting as a transfer from [place] ED for concern of right toe osteomyelitis.     Acute Medical Issues:  #Right toe and foot cellulitis c/b right toe osteomyelitis  #Likely 2/2 neuropathy of b/l lower extremities - unknown etiology  - Hemodynamically stable  - CRP 8.89  - Blood cultures 11/9 pending  - Xray of right foot with destruction and fragmentation of distal 1st digit phalanx  PLAN:  - Continue IV Vanc and zosyn  - Follow up blood cultures 11/9  - Podiatry consulted, likely OR tomorrow with Dr. Gillespie  - ID consulted  - MR right foot ordered     Chronic Medical Issues:  #T2DM (well-controlled): A1C 5.8 in August 2024. Holding home ozempic. Started SSI  #possible HLD and HTN: patient denies both; not on any meds      F: None  E: Replete as needed  N: Regular diet  GI ppx: None  DVT ppx: Lovenox subcutaneous. Will hold tmrw am for possible surgery  Tubes/Lines/Drains: None    Code status: Full Code  Dispo: 54 y.o. male admitted for right toe osteomyelitis. Anticipate LOS > 48 hrs.      Melissa Servin MD  PGY-1

## 2024-11-09 NOTE — LETTER
November 12, 2024     Patient: Twan Camacho   YOB: 1970   Date of Visit: 11/9/2024       To Whom It May Concern:    Twan Camacho was hospitalized from 11/9/24 - 11/12/24 at [facility]. Please excuse patient's wife from work during this time, as she was present in support of the patient.    Thank you for your understanding.      Sincerely,     Paula Jones DO

## 2024-11-09 NOTE — PROGRESS NOTES
Vancomycin Dosing by Pharmacy- INITIAL  Twan Camacho is a 54 y.o. year old male who Pharmacy has been consulted for vancomycin dosing for osteomyelitis/septic arthritis. Based on the patient's indication and renal status this patient will be dosed based on a goal AUC of 400-600.     Renal function is currently stable.    Visit Vitals  /75 (BP Location: Right arm, Patient Position: Lying)   Pulse 97   Temp 36.7 °C (98.1 °F) (Temporal)   Resp 16        Lab Results   Component Value Date    CREATININE 1.03 2024    CREATININE 0.73 2021    CREATININE 0.65 2018    CREATININE 0.75 2018    CREATININE 0.93 10/31/2018        Patient weight is as follows: There were no vitals filed for this visit.    Cultures:  No results found for the encounter in last 14 days.        No intake/output data recorded.  I/O during current shift:  No intake/output data recorded.    Temp (24hrs), Av.7 °C (98.1 °F), Min:36.7 °C (98.1 °F), Max:36.7 °C (98.1 °F)         Assessment/Plan  Patient has already been given a loading dose of 2000 mg.  Will initiate vancomycin maintenance,  1500 mg every 12 hours.    This dosing regimen is predicted by [employer] to result in the following pharmacokinetic parameters:  Regimen: 1500 mg IV every 12 hours.  Start time: 23:00 on 2024  Exposure target: AUC24 (range)400-600 mg/L.hr   DET05-53: 541 mg/L.hr  AUC24,ss: 544 mg/L.hr  Probability of AUC24 > 400: 72 %  Ctrough,ss: 13.9 mg/L  Probability of Ctrough,ss > 20: 34 %    Follow-up level will be ordered on 11/10 at 05:00 unless clinically indicated sooner.  Will continue to monitor renal function daily while on vancomycin and order serum creatinine at least every 48 hours if not already ordered.  Follow for continued vancomycin needs, clinical response, and signs/symptoms of toxicity.       Emilia Limon, PharmD

## 2024-11-09 NOTE — LETTER
November 12, 2024     Patient: Twan Camacho   YOB: 1970   Date of Visit: 11/9/2024       To Whom It May Concern:    Twan Camacho was hospitalized from 11/9/24 to 11/12/24 at [facility]. Please excuse patient from work until Monday (11/18/24) at which time may return to work with reasonable accommodations. Further clearance is needed from podiatry to amend ambulation restrictions.      Thank you for your understanding.         Sincerely,     Paula Jones DO

## 2024-11-09 NOTE — ED TRIAGE NOTES
Per pt, R 1st toe is swollen and painful. Pt has been struggling with infection in the area for over 2 years, has seen a podiatrist in the past, last saw them about 2 months ago. Pt states this time, it has been going on 2 days

## 2024-11-09 NOTE — ED NOTES
[facility] [address], [phone]     Jolene Glass, EMT  11/09/24 1222

## 2024-11-09 NOTE — ED PROVIDER NOTES
Department of Emergency Medicine   ED  Provider Note  Admit Date/RoomTime: 11/9/2024  9:50 AM  ED Room: AC01/01                  History of Present Illness:   Twan Camacho is a 54 y.o. male presenting to the ED for right great toe and foot swelling, beginning on and off last 2 years but much  worse last 2 days.  The complaint has been persistent, moderate in severity, and worsened by nothing.  Patient restarted his Bactrim DS twice a day 1-2 days ago.  Increased swelling and redness over last 24-48 hours.  No fever/chills.  Has been previously seen in the wound care clinic.  Is seeing a podiatrist in [place] (Dr. SHARON Foreman) No new trauma or injury.  Also has some  redness over the anterior aspect of the Right lower leg.      Review of Systems:   Pertinent positives and review of systems as noted above.  Remaining 10 review of systems is negative or noncontributory to today's episode of care.  Review of Systems   A Complete review  of systems is otherwise negative  except as noted above.    --------------------------------------------- PAST HISTORY ---------------------------------------------  Past Medical History:  has a past medical history of Personal history of other endocrine, nutritional and metabolic disease.    Past Surgical History:  has no past surgical history on file.    Social History:  reports that he has never smoked. He has never used smokeless tobacco. He reports that he does not drink alcohol and does not use drugs.    Family History: family history is not on file. Unless otherwise noted, family history is non contributory    Patient's Medications   New Prescriptions    No medications on file   Previous Medications    OZEMPIC 1 MG/DOSE (4 MG/3 ML) PEN INJECTOR    Inject 1 mg under the skin 1 (one) time per week.    SULFAMETHOXAZOLE-TRIMETHOPRIM (BACTRIM DS) 800-160 MG TABLET    Take 1 tablet by mouth 2 times a day.   Modified Medications    No medications on file   Discontinued Medications     No medications on file      The patient’s home medications have been reviewed.    Allergies: Patient has no known allergies.    -------------------------------------------------- RESULTS -------------------------------------------------  All laboratory and radiology results have been personally reviewed by myself   LABS:  Labs Reviewed   CBC WITH AUTO DIFFERENTIAL - Abnormal       Result Value    WBC 9.9      nRBC 0.0      RBC 4.45 (*)     Hemoglobin 14.4      Hematocrit 41.5      MCV 93      MCH 32.4      MCHC 34.7      RDW 12.1      Platelets 275      Neutrophils % 74.4      Immature Granulocytes %, Automated 0.3      Lymphocytes % 16.0      Monocytes % 7.9      Eosinophils % 1.0      Basophils % 0.4      Neutrophils Absolute 7.32      Immature Granulocytes Absolute, Automated 0.03      Lymphocytes Absolute 1.58      Monocytes Absolute 0.78      Eosinophils Absolute 0.10      Basophils Absolute 0.04     BASIC METABOLIC PANEL - Abnormal    Glucose 130 (*)     Sodium 133 (*)     Potassium 3.8      Chloride 101      Bicarbonate 27      Anion Gap 9 (*)     Urea Nitrogen 9      Creatinine 1.03      eGFR 87      Calcium 8.9     C-REACTIVE PROTEIN - Abnormal    C-Reactive Protein 8.89 (*)    MAGNESIUM - Normal    Magnesium 1.85     LACTATE - Normal    Lactate 1.1      Narrative:     Venipuncture immediately after or during the administration of Metamizole may lead to falsely low results. Testing should be performed immediately prior to Metamizole dosing.   SEDIMENTATION RATE, AUTOMATED - Normal    Sedimentation Rate 10     BLOOD CULTURE   BLOOD CULTURE         RADIOLOGY:  Interpreted by Radiologist.  XR foot right 3+ views   Final Result   Findings most compatible with cellulitis with osteomyelitis with   destruction of the distal phalanx of the 1st digit with fragmentation   of the proximal portion of the distal phalanx. MRI may be helpful to   further characterize extent of process.        MACRO:   None        Signed  "by: Johnathon Perry 11/9/2024 10:34 AM   Dictation workstation:   [id number]      XR tibia fibula right 2 views   Final Result   As above without osseous injury evident.        Signed by: Johnathon Perry 11/9/2024 10:35 AM   Dictation workstation:   [id number]          No results found for this or any previous visit (from the past 4464 hours).  ------------------------- NURSING NOTES AND VITALS REVIEWED ---------------------------   The nursing notes within the ED encounter and vital signs as below have been reviewed.   /76   Pulse 91   Temp 36.7 °C (98.1 °F) (Temporal)   Resp 17   Ht 1.676 m (5' 6\")   Wt 126 kg (277 lb)   SpO2 98%   BMI 44.71 kg/m²   Oxygen Saturation Interpretation: Normal      ---------------------------------------------------PHYSICAL EXAM--------------------------------------  Physical Exam  Vitals and nursing note reviewed.   Constitutional:       General: He is not in acute distress.     Appearance: He is well-developed. He is not ill-appearing or toxic-appearing.   HENT:      Head: Normocephalic and atraumatic.      Nose: Nose normal.      Mouth/Throat:      Mouth: Mucous membranes are moist.      Pharynx: Oropharynx is clear.   Eyes:      Extraocular Movements: Extraocular movements intact.      Conjunctiva/sclera: Conjunctivae normal.      Pupils: Pupils are equal, round, and reactive to light.   Cardiovascular:      Rate and Rhythm: Normal rate and regular rhythm.      Pulses: Normal pulses.      Heart sounds: Normal heart sounds. No murmur heard.  Pulmonary:      Effort: Pulmonary effort is normal. No respiratory distress.      Breath sounds: Normal breath sounds. No wheezing, rhonchi or rales.   Abdominal:      General: Bowel sounds are normal.      Palpations: Abdomen is soft.      Tenderness: There is no abdominal tenderness. There is no guarding or rebound.   Musculoskeletal:         General: Swelling and tenderness present. No deformity or signs of injury. Normal " range of motion.      Cervical back: Normal range of motion and neck supple. No rigidity or tenderness.      Right lower leg: Edema present.      Left lower leg: No edema.      Comments: Patient has swelling and redness of the right great toe and the dorsum of the right foot.  The right great toe is much more swollen than the foot.  It is warm to the touch.  There are skin changes over the right great toe.  There are some sloughing of skin.  There is also some redness over the anterior aspect of the distal tibia.  No open sores.   Lymphadenopathy:      Cervical: No cervical adenopathy.   Skin:     General: Skin is warm and dry.      Capillary Refill: Capillary refill takes less than 2 seconds.   Neurological:      Mental Status: He is alert.   Psychiatric:         Mood and Affect: Mood normal.            Procedures  None  ------------------------------ ED COURSE/MEDICAL DECISION MAKING----------------------    Medical Decision Making:   Patient was seen and examined by me.  Patient had obvious cellulitis and/or osteomyelitis of the right foot and right great toe.  Lab work obtained and reviewed.  X-ray imaging confirms osteomyelitis of the right great toe and right second toe.  Patient also has cellulitis of the right toe and right foot.    Patient was started on IV Zosyn and IV vancomycin.  Patient was discussed with podiatry on-call Dr. SHARON Gillespie.  I d/w  Dr Thomas  at [facility] and patient accepted there in transfer.      ED Course as of 11/09/24 1320   Sat Nov 09, 2024   1037 White blood cell count is normal at 9.9, hemoglobin 14.4, hematocrit 41.5, platelet count 275,000 [EC]   1038 Right tibia/fibula  FINDINGS:  No fracture or dislocation is evident. Surgical and degenerative  changes seen of the knee. There is calcaneal enthesophyte formation.  Varicose veins are seen in the subcutaneous tissues. There is some  heterotopic ossification in the calf. Vascular calcifications are  also seen in the  soft tissues. No soft tissue gas is evident.      IMPRESSION:  As above without osseous injury evident.   [EC]   1038 Right foot x-ray:  FINDINGS:  There is destruction the majority of the tuft and diaphysis of the  distal phalanx of the 1st digit with comminuted fragmentation and  some destruction of the base.  There is an os naviculare.  Degenerative changes seen of the midfoot. There is calcaneal  enthesophyte formation.   No ankle joint effusion is evident. Soft  tissue swelling is seen of the 1st digit.      IMPRESSION:  Findings most compatible with cellulitis with osteomyelitis with  destruction of the distal phalanx of the 1st digit with fragmentation  of the proximal portion of the distal phalanx. MRI may be helpful to  further characterize extent of process.   [EC]   1315 CRP elevated at 8.89 consistent with osteomyelitis.  Sed rate is normal at 10  Lactate is normal at 1.1 [EC]   1315 I had secure chat with Dr. SHARON Gillespie,  recommends transfer to Hospital with vascular services.  Patient requires workup for peripheral vascular disease.    Patient all likelihood will require surgical intervention for the osteomyelitis of the toe and foot and requires ongoing IV antibiotics. [EC]   1316 I discussed the case with the  hospitalist Dr. Thomas at [facility].  Patient accepted there in transfer.  Patient is agreeable to transfer. [EC]      ED Course User Index  [EC] Cecil Zee,          Diagnoses as of 11/09/24 1320   Osteomyelitis of great toe of right foot (Multi)   Cellulitis of right foot   Type 2 diabetes mellitus without complication, without long-term current use of insulin (Multi)      Counseling:   The emergency provider has spoken with the patient and discussed today’s results, in addition to providing specific details for the plan of care and counseling regarding the diagnosis and prognosis.  Questions are answered at this time and they are agreeable with the  plan.      --------------------------------- IMPRESSION AND DISPOSITION ---------------------------------        IMPRESSION  1. Osteomyelitis of great toe of right foot (Multi)    2. Cellulitis of right foot    3. Type 2 diabetes mellitus without complication, without long-term current use of insulin (Multi)        DISPOSITION  Disposition:  Transfer to [facility]  Patient condition is fair      Billing Provider Critical Care Time: 0 minutes     Cecil Zee, DO  11/09/24 7630

## 2024-11-09 NOTE — LETTER
November 12, 2024     Patient: Twan Camacho   YOB: 1970   Date of Visit: 11/9/2024       To Whom It May Concern:    Twan Camacho was hospitalized from 11/9/24 to 11/12/24 at [facility]. Please excuse patient from work during this time.May return to work with reasonable accommodations.      Thank you for your understanding.         Sincerely,         Paula Jones DO

## 2024-11-10 ENCOUNTER — ANESTHESIA (OUTPATIENT)
Dept: OPERATING ROOM | Facility: HOSPITAL | Age: 54
End: 2024-11-10
Payer: COMMERCIAL

## 2024-11-10 ENCOUNTER — ANESTHESIA EVENT (OUTPATIENT)
Dept: OPERATING ROOM | Facility: HOSPITAL | Age: 54
End: 2024-11-10
Payer: COMMERCIAL

## 2024-11-10 ENCOUNTER — APPOINTMENT (OUTPATIENT)
Dept: RADIOLOGY | Facility: HOSPITAL | Age: 54
DRG: 617 | End: 2024-11-10
Payer: COMMERCIAL

## 2024-11-10 VITALS
DIASTOLIC BLOOD PRESSURE: 78 MMHG | OXYGEN SATURATION: 97 % | HEART RATE: 85 BPM | HEIGHT: 66 IN | WEIGHT: 277.78 LBS | TEMPERATURE: 98.6 F | BODY MASS INDEX: 44.64 KG/M2 | RESPIRATION RATE: 16 BRPM | SYSTOLIC BLOOD PRESSURE: 125 MMHG

## 2024-11-10 PROBLEM — G47.33 OSA (OBSTRUCTIVE SLEEP APNEA): Status: ACTIVE | Noted: 2024-11-10

## 2024-11-10 PROBLEM — E66.9 OBESITY: Status: ACTIVE | Noted: 2024-11-10

## 2024-11-10 PROBLEM — E10.9 DIABETES MELLITUS TYPE 1 (MULTI): Status: ACTIVE | Noted: 2024-11-10

## 2024-11-10 PROBLEM — I10 HTN (HYPERTENSION): Status: ACTIVE | Noted: 2024-11-10

## 2024-11-10 PROBLEM — E11.9 DIABETES MELLITUS, TYPE 2 (MULTI): Status: ACTIVE | Noted: 2024-11-10

## 2024-11-10 LAB
ALBUMIN SERPL BCP-MCNC: 3.4 G/DL (ref 3.4–5)
ANION GAP SERPL CALC-SCNC: 14 MMOL/L (ref 10–20)
BACTERIA BLD CULT: NORMAL
BACTERIA BLD CULT: NORMAL
BUN SERPL-MCNC: 10 MG/DL (ref 6–23)
CALCIUM SERPL-MCNC: 8.7 MG/DL (ref 8.6–10.3)
CHLORIDE SERPL-SCNC: 100 MMOL/L (ref 98–107)
CO2 SERPL-SCNC: 24 MMOL/L (ref 21–32)
CREAT SERPL-MCNC: 0.89 MG/DL (ref 0.5–1.3)
EGFRCR SERPLBLD CKD-EPI 2021: >90 ML/MIN/1.73M*2
ERYTHROCYTE [DISTWIDTH] IN BLOOD BY AUTOMATED COUNT: 12.2 % (ref 11.5–14.5)
GLUCOSE BLD MANUAL STRIP-MCNC: 108 MG/DL (ref 74–99)
GLUCOSE BLD MANUAL STRIP-MCNC: 151 MG/DL (ref 74–99)
GLUCOSE BLD MANUAL STRIP-MCNC: 153 MG/DL (ref 74–99)
GLUCOSE BLD MANUAL STRIP-MCNC: 156 MG/DL (ref 74–99)
GLUCOSE SERPL-MCNC: 107 MG/DL (ref 74–99)
GRAM STN SPEC: ABNORMAL
GRAM STN SPEC: ABNORMAL
HCT VFR BLD AUTO: 44.2 % (ref 41–52)
HGB BLD-MCNC: 14.5 G/DL (ref 13.5–17.5)
MAGNESIUM SERPL-MCNC: 1.87 MG/DL (ref 1.6–2.4)
MCH RBC QN AUTO: 31.2 PG (ref 26–34)
MCHC RBC AUTO-ENTMCNC: 32.8 G/DL (ref 32–36)
MCV RBC AUTO: 95 FL (ref 80–100)
NRBC BLD-RTO: 0 /100 WBCS (ref 0–0)
PHOSPHATE SERPL-MCNC: 3.8 MG/DL (ref 2.5–4.9)
PLATELET # BLD AUTO: 258 X10*3/UL (ref 150–450)
POTASSIUM SERPL-SCNC: 3.7 MMOL/L (ref 3.5–5.3)
RBC # BLD AUTO: 4.65 X10*6/UL (ref 4.5–5.9)
SODIUM SERPL-SCNC: 134 MMOL/L (ref 136–145)
VANCOMYCIN SERPL-MCNC: 11 UG/ML (ref 5–20)
WBC # BLD AUTO: 9.9 X10*3/UL (ref 4.4–11.3)

## 2024-11-10 PROCEDURE — 80202 ASSAY OF VANCOMYCIN: CPT

## 2024-11-10 PROCEDURE — 2500000001 HC RX 250 WO HCPCS SELF ADMINISTERED DRUGS (ALT 637 FOR MEDICARE OP): Performed by: PODIATRIST

## 2024-11-10 PROCEDURE — 3700000001 HC GENERAL ANESTHESIA TIME - INITIAL BASE CHARGE: Performed by: PODIATRIST

## 2024-11-10 PROCEDURE — 2500000004 HC RX 250 GENERAL PHARMACY W/ HCPCS (ALT 636 FOR OP/ED): Performed by: PODIATRIST

## 2024-11-10 PROCEDURE — 2500000005 HC RX 250 GENERAL PHARMACY W/O HCPCS: Performed by: INTERNAL MEDICINE

## 2024-11-10 PROCEDURE — 83735 ASSAY OF MAGNESIUM: CPT

## 2024-11-10 PROCEDURE — 3600000003 HC OR TIME - INITIAL BASE CHARGE - PROCEDURE LEVEL THREE: Performed by: PODIATRIST

## 2024-11-10 PROCEDURE — 3700000002 HC GENERAL ANESTHESIA TIME - EACH INCREMENTAL 1 MINUTE: Performed by: PODIATRIST

## 2024-11-10 PROCEDURE — A28820 PR AMPUTATION TOE,MT-P JT: Performed by: NURSE ANESTHETIST, CERTIFIED REGISTERED

## 2024-11-10 PROCEDURE — 85027 COMPLETE CBC AUTOMATED: CPT

## 2024-11-10 PROCEDURE — 2500000004 HC RX 250 GENERAL PHARMACY W/ HCPCS (ALT 636 FOR OP/ED)

## 2024-11-10 PROCEDURE — 87077 CULTURE AEROBIC IDENTIFY: CPT | Mod: GEALAB | Performed by: PODIATRIST

## 2024-11-10 PROCEDURE — 82947 ASSAY GLUCOSE BLOOD QUANT: CPT

## 2024-11-10 PROCEDURE — 0753T DGTZ GLS MCRSCP SLD LEVEL IV: CPT | Mod: TC,GEALAB,WESLAB | Performed by: PODIATRIST

## 2024-11-10 PROCEDURE — 73720 MRI LWR EXTREMITY W/O&W/DYE: CPT | Mod: RIGHT SIDE | Performed by: RADIOLOGY

## 2024-11-10 PROCEDURE — 2720000007 HC OR 272 NO HCPCS: Performed by: PODIATRIST

## 2024-11-10 PROCEDURE — 36415 COLL VENOUS BLD VENIPUNCTURE: CPT

## 2024-11-10 PROCEDURE — 99140 ANES COMP EMERGENCY COND: CPT | Performed by: ANESTHESIOLOGY

## 2024-11-10 PROCEDURE — A28820 PR AMPUTATION TOE,MT-P JT: Performed by: ANESTHESIOLOGY

## 2024-11-10 PROCEDURE — 1100000001 HC PRIVATE ROOM DAILY

## 2024-11-10 PROCEDURE — 7100000002 HC RECOVERY ROOM TIME - EACH INCREMENTAL 1 MINUTE: Performed by: PODIATRIST

## 2024-11-10 PROCEDURE — 3600000008 HC OR TIME - EACH INCREMENTAL 1 MINUTE - PROCEDURE LEVEL THREE: Performed by: PODIATRIST

## 2024-11-10 PROCEDURE — 7100000001 HC RECOVERY ROOM TIME - INITIAL BASE CHARGE: Performed by: PODIATRIST

## 2024-11-10 PROCEDURE — 99232 SBSQ HOSP IP/OBS MODERATE 35: CPT

## 2024-11-10 PROCEDURE — 80069 RENAL FUNCTION PANEL: CPT

## 2024-11-10 PROCEDURE — 2500000004 HC RX 250 GENERAL PHARMACY W/ HCPCS (ALT 636 FOR OP/ED): Performed by: INTERNAL MEDICINE

## 2024-11-10 PROCEDURE — 73720 MRI LWR EXTREMITY W/O&W/DYE: CPT | Mod: RT

## 2024-11-10 PROCEDURE — 2550000001 HC RX 255 CONTRASTS: Performed by: PODIATRIST

## 2024-11-10 PROCEDURE — 0Y6P0Z0 DETACHMENT AT RIGHT 1ST TOE, COMPLETE, OPEN APPROACH: ICD-10-PCS | Performed by: PODIATRIST

## 2024-11-10 PROCEDURE — 2500000005 HC RX 250 GENERAL PHARMACY W/O HCPCS: Performed by: PODIATRIST

## 2024-11-10 PROCEDURE — 2500000004 HC RX 250 GENERAL PHARMACY W/ HCPCS (ALT 636 FOR OP/ED): Performed by: NURSE ANESTHETIST, CERTIFIED REGISTERED

## 2024-11-10 PROCEDURE — A9575 INJ GADOTERATE MEGLUMI 0.1ML: HCPCS | Performed by: PODIATRIST

## 2024-11-10 PROCEDURE — 87070 CULTURE OTHR SPECIMN AEROBIC: CPT | Mod: GEALAB | Performed by: PODIATRIST

## 2024-11-10 RX ORDER — PROPOFOL 10 MG/ML
INJECTION, EMULSION INTRAVENOUS AS NEEDED
Status: DISCONTINUED | OUTPATIENT
Start: 2024-11-10 | End: 2024-11-10

## 2024-11-10 RX ORDER — GADOTERATE MEGLUMINE 376.9 MG/ML
9 INJECTION INTRAVENOUS
Status: COMPLETED | OUTPATIENT
Start: 2024-11-10 | End: 2024-11-10

## 2024-11-10 RX ORDER — LIDOCAINE HYDROCHLORIDE 10 MG/ML
0.1 INJECTION, SOLUTION EPIDURAL; INFILTRATION; INTRACAUDAL; PERINEURAL ONCE
Status: DISCONTINUED | OUTPATIENT
Start: 2024-11-10 | End: 2024-11-10

## 2024-11-10 RX ORDER — BUPIVACAINE HYDROCHLORIDE 5 MG/ML
INJECTION, SOLUTION EPIDURAL; INTRACAUDAL AS NEEDED
Status: DISCONTINUED | OUTPATIENT
Start: 2024-11-10 | End: 2024-11-10 | Stop reason: HOSPADM

## 2024-11-10 RX ORDER — ONDANSETRON HYDROCHLORIDE 2 MG/ML
4 INJECTION, SOLUTION INTRAVENOUS ONCE AS NEEDED
Status: DISCONTINUED | OUTPATIENT
Start: 2024-11-10 | End: 2024-11-10

## 2024-11-10 RX ORDER — METOCLOPRAMIDE HYDROCHLORIDE 5 MG/ML
10 INJECTION INTRAMUSCULAR; INTRAVENOUS ONCE AS NEEDED
Status: DISCONTINUED | OUTPATIENT
Start: 2024-11-10 | End: 2024-11-10

## 2024-11-10 RX ORDER — KETAMINE HYDROCHLORIDE 10 MG/ML
INJECTION, SOLUTION INTRAMUSCULAR; INTRAVENOUS AS NEEDED
Status: DISCONTINUED | OUTPATIENT
Start: 2024-11-10 | End: 2024-11-10

## 2024-11-10 RX ORDER — ACETAMINOPHEN 325 MG/1
650 TABLET ORAL EVERY 4 HOURS PRN
Status: DISCONTINUED | OUTPATIENT
Start: 2024-11-10 | End: 2024-11-10

## 2024-11-10 RX ORDER — LIDOCAINE HYDROCHLORIDE 10 MG/ML
INJECTION, SOLUTION INFILTRATION; PERINEURAL AS NEEDED
Status: DISCONTINUED | OUTPATIENT
Start: 2024-11-10 | End: 2024-11-10

## 2024-11-10 RX ORDER — GADOTERATE MEGLUMINE 376.9 MG/ML
15 INJECTION INTRAVENOUS
Status: COMPLETED | OUTPATIENT
Start: 2024-11-10 | End: 2024-11-10

## 2024-11-10 RX ORDER — ENOXAPARIN SODIUM 100 MG/ML
40 INJECTION SUBCUTANEOUS EVERY 12 HOURS SCHEDULED
Status: ACTIVE | OUTPATIENT
Start: 2024-11-10

## 2024-11-10 RX ORDER — MIDAZOLAM HYDROCHLORIDE 1 MG/ML
INJECTION INTRAMUSCULAR; INTRAVENOUS AS NEEDED
Status: DISCONTINUED | OUTPATIENT
Start: 2024-11-10 | End: 2024-11-10

## 2024-11-10 RX ORDER — ONDANSETRON HYDROCHLORIDE 2 MG/ML
INJECTION, SOLUTION INTRAVENOUS AS NEEDED
Status: DISCONTINUED | OUTPATIENT
Start: 2024-11-10 | End: 2024-11-10

## 2024-11-10 RX ADMIN — PROPOFOL 75 MCG/KG/MIN: 10 INJECTION, EMULSION INTRAVENOUS at 10:25

## 2024-11-10 RX ADMIN — GADOTERATE MEGLUMINE 15 ML: 376.9 INJECTION INTRAVENOUS at 13:10

## 2024-11-10 RX ADMIN — ONDANSETRON 4 MG: 2 INJECTION, SOLUTION INTRAMUSCULAR; INTRAVENOUS at 10:47

## 2024-11-10 RX ADMIN — KETAMINE HYDROCHLORIDE 20 MG: 10 INJECTION, SOLUTION INTRAMUSCULAR; INTRAVENOUS at 10:19

## 2024-11-10 RX ADMIN — PROPOFOL 50 MG: 10 INJECTION, EMULSION INTRAVENOUS at 10:19

## 2024-11-10 RX ADMIN — SODIUM CHLORIDE: 9 INJECTION, SOLUTION INTRAVENOUS at 10:16

## 2024-11-10 RX ADMIN — KETAMINE HYDROCHLORIDE 10 MG: 10 INJECTION, SOLUTION INTRAMUSCULAR; INTRAVENOUS at 10:30

## 2024-11-10 RX ADMIN — MIDAZOLAM HYDROCHLORIDE 2 MG: 1 INJECTION, SOLUTION INTRAMUSCULAR; INTRAVENOUS at 10:16

## 2024-11-10 RX ADMIN — GADOTERATE MEGLUMINE 9 ML: 376.9 INJECTION INTRAVENOUS at 13:11

## 2024-11-10 RX ADMIN — LIDOCAINE HYDROCHLORIDE 50 MG: 10 INJECTION, SOLUTION INFILTRATION; PERINEURAL at 10:19

## 2024-11-10 RX ADMIN — VANCOMYCIN HYDROCHLORIDE 2 G: 1 INJECTION, POWDER, LYOPHILIZED, FOR SOLUTION INTRAVENOUS at 10:30

## 2024-11-10 RX ADMIN — PIPERACILLIN SODIUM AND TAZOBACTAM SODIUM 3.38 G: 3; .375 INJECTION, SOLUTION INTRAVENOUS at 05:01

## 2024-11-10 RX ADMIN — PIPERACILLIN SODIUM AND TAZOBACTAM SODIUM 3.38 G: 3; .375 INJECTION, SOLUTION INTRAVENOUS at 14:00

## 2024-11-10 RX ADMIN — VANCOMYCIN HYDROCHLORIDE 2000 MG: 10 INJECTION, POWDER, LYOPHILIZED, FOR SOLUTION INTRAVENOUS at 09:38

## 2024-11-10 RX ADMIN — VANCOMYCIN HYDROCHLORIDE 2000 MG: 10 INJECTION, POWDER, LYOPHILIZED, FOR SOLUTION INTRAVENOUS at 21:35

## 2024-11-10 RX ADMIN — OXYCODONE HYDROCHLORIDE 5 MG: 5 TABLET ORAL at 19:51

## 2024-11-10 RX ADMIN — PIPERACILLIN SODIUM AND TAZOBACTAM SODIUM 3.38 G: 3; .375 INJECTION, SOLUTION INTRAVENOUS at 19:51

## 2024-11-10 RX ADMIN — KETAMINE HYDROCHLORIDE 10 MG: 10 INJECTION, SOLUTION INTRAMUSCULAR; INTRAVENOUS at 10:39

## 2024-11-10 SDOH — HEALTH STABILITY: MENTAL HEALTH: CURRENT SMOKER: 0

## 2024-11-10 ASSESSMENT — COGNITIVE AND FUNCTIONAL STATUS - GENERAL
MOBILITY SCORE: 24
DAILY ACTIVITIY SCORE: 24

## 2024-11-10 ASSESSMENT — PAIN DESCRIPTION - DESCRIPTORS: DESCRIPTORS: SHARP

## 2024-11-10 ASSESSMENT — PAIN - FUNCTIONAL ASSESSMENT
PAIN_FUNCTIONAL_ASSESSMENT: 0-10

## 2024-11-10 ASSESSMENT — PAIN SCALES - GENERAL
PAINLEVEL_OUTOF10: 0 - NO PAIN
PAIN_LEVEL: 0
PAINLEVEL_OUTOF10: 0 - NO PAIN
PAINLEVEL_OUTOF10: 2
PAINLEVEL_OUTOF10: 5 - MODERATE PAIN

## 2024-11-10 NOTE — ANESTHESIA PREPROCEDURE EVALUATION
Patient: Twan Camacho    Procedure Information       Date/Time: 11/10/24 1048    Procedure: Amputation Digit Foot (Right) - Right hallux amputation    [facility]: [facility] / [facility]    Surgeons: Windy Santana DPM            Relevant Problems   Anesthesia (within normal limits)      Cardiac   (+) HTN (hypertension)      Pulmonary   (+) MARGARET (obstructive sleep apnea)      Endocrine   (+) Diabetes mellitus, type 2 (Multi)   (+) Obesity      ID   (+) Osteomyelitis       Clinical information reviewed:    Allergies  Meds  Problems              NPO Detail:  No data recorded     Physical Exam    Airway  Mallampati: III  TM distance: >3 FB  Neck ROM: full     Cardiovascular - normal exam  Rate: normal     Dental - normal exam     Pulmonary - normal exam  Breath sounds clear to auscultation     Abdominal            Anesthesia Plan    History of general anesthesia?: yes  History of complications of general anesthesia?: no    ASA 3 - emergent     MAC     The patient is not a current smoker.    intravenous induction   Anesthetic plan and risks discussed with patient.  Use of blood products discussed with patient who.    Plan discussed with CRNA.

## 2024-11-10 NOTE — PROGRESS NOTES
Twan Camacho is a 54 y.o. male on day 1 of admission presenting with Osteomyelitis.    Subjective   Interval History: no fever, no new complaints         Review of Systems    Objective   Range of Vitals (last 24 hours)  Heart Rate:  [74-98]   Temp:  [36.1 °C (97 °F)-36.9 °C (98.4 °F)]   Resp:  [16-19]   BP: (127-155)/(69-83)   SpO2:  [94 %-97 %]   Daily Weight  11/09/24 : 126 kg (277 lb 12.5 oz)    Body mass index is 44.86 kg/m².    Physical Exam  Constitutional:       Appearance: Normal appearance.   HENT:      Head: Normocephalic and atraumatic.      Mouth/Throat:      Mouth: Mucous membranes are moist.      Pharynx: Oropharynx is clear.   Eyes:      Pupils: Pupils are equal, round, and reactive to light.   Cardiovascular:      Rate and Rhythm: Normal rate and regular rhythm.      Heart sounds: Normal heart sounds.   Pulmonary:      Effort: Pulmonary effort is normal.      Breath sounds: Normal breath sounds.   Abdominal:      General: Abdomen is flat. Bowel sounds are normal.      Palpations: Abdomen is soft.   Musculoskeletal:      Cervical back: Normal range of motion.      Comments: Rt foot edema, redness, Rt 1st toe swelling, redness, tenderness   Neurological:      Mental Status: He is alert.         Antibiotics  piperacillin-tazobactam - 3.375 gram/50 mL  vancomycin  vancomycin piggyback - 2000 mg/500 ml    Relevant Results  Labs  Results from last 72 hours   Lab Units 11/10/24  0532 11/09/24  1005   WBC AUTO x10*3/uL 9.9 9.9   HEMOGLOBIN g/dL 14.5 14.4   HEMATOCRIT % 44.2 41.5   PLATELETS AUTO x10*3/uL 258 275   NEUTROS PCT AUTO %  --  74.4   LYMPHS PCT AUTO %  --  16.0   MONOS PCT AUTO %  --  7.9   EOS PCT AUTO %  --  1.0     Results from last 72 hours   Lab Units 11/10/24  0532 11/09/24  1005   SODIUM mmol/L 134* 133*   POTASSIUM mmol/L 3.7 3.8   CHLORIDE mmol/L 100 101   CO2 mmol/L 24 27   BUN mg/dL 10 9   CREATININE mg/dL 0.89 1.03   GLUCOSE mg/dL 107* 130*   CALCIUM mg/dL 8.7 8.9   ANION GAP mmol/L  14 9*   EGFR mL/min/1.73m*2 >90 87   PHOSPHORUS mg/dL 3.8  --      Results from last 72 hours   Lab Units 11/10/24  0532   ALBUMIN g/dL 3.4     Estimated Creatinine Clearance: 121.8 mL/min (by C-G formula based on SCr of 0.89 mg/dL).  C-Reactive Protein   Date Value Ref Range Status   11/09/2024 8.89 (H) <1.00 mg/dL Final     Microbiology  Reviewed  Imaging  Reviewed        Assessment/Plan      Right 1st toe infection / osteomyelitis / Rt foot cellulitis, for toe amputation     Recommendations :  Continue Zosyn and Vancomycin  Discussed with the surgery team    I spent minutes in the professional and overall care of this patient.      Norbert Coughlin MD

## 2024-11-10 NOTE — ASSESSMENT & PLAN NOTE
Twan Camacho is a 54 y.o. male with PMH of Type 2 DM, HTN, HLD, and presenting as a transfer from [facility] ED for concern of right toe osteomyelitis.     Acute Medical Issues:    # Right toe and foot cellulitis c/b right toe osteomyelitis  # S/P Right hallux amputation with removal of 1st metatarsal head and sesamoids. 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  - Podiatry on board, Had Right hallux amputation with removal of 1st metatarsal head and sesamoids, today AM by Dr LUZ , recommended continue IV antibiotic as per ID  - wound care consulted.     PLAN:  - Continue IV Vanc and zosyn  - follow BC and tissue culture.   - ID on board.  Recommended vascular evaluation, keep the leg elevated, wound care.   - Pain management Tylenol 650 every 6 hrs for mild, oxycodone 5 mg every 6 hrs prn for moderate and oxycodone 10 mg every 6 as needed for severe pain.

## 2024-11-10 NOTE — BRIEF OP NOTE
Date: 11/10/2024  OR Location: [facility] OR    Name: Twan Camacho, : 1970, Age: 54 y.o., MRN: [id number], Sex: male    Diagnosis  Pre-op Diagnosis      * Osteomyelitis [M86.9] Post-op Diagnosis     * Osteomyelitis [M86.9]     Procedures  Amputation Digit Foot  83584 - LA AMPUTATION TOE METATARSOPHALANGEAL JOINT    Right hallux amputation with removal of 1st metatarsal head and sesamoids  Surgeons      * Windy Santana - Primary    Resident/Fellow/Other Assistant:  Surgeons and Role:  * No surgeons found with a matching role *    Staff:   Circulator: Liana Hassan Person: Venkatesh    Anesthesia Staff: Anesthesiologist: Connie Tang DO  CRNA: JUDY Bell-CRNA    Procedure Summary  Anesthesia: Monitor Anesthesia Care  ASA: III  Estimated Blood Loss: 5 mL  Intra-op Medications:   Administrations occurring from 1048 to 1151 on 11/10/24:   Medication Name Total Dose   bupivacaine PF 0.5 % (Marcaine) 0.5 % (5 mg/mL) injection 10 mL   insulin lispro injection 0-10 Units Cannot be calculated   piperacillin-tazobactam (Zosyn) 3.375 g in dextrose (iso) IV 50 mL Cannot be calculated   propofol (Diprivan) injection 10 mg/mL Cannot be calculated   NaCl 0.9 % bolus Cannot be calculated              Anesthesia Record               Intraprocedure I/O Totals          Intake    Ketamine 0.00 mL    The total shown is the total volume documented since Anesthesia Start was filed.    NaCl 0.9 % bolus 200.00 mL    vancomycin (Vancocin) 2,000 mg in sodium chloride 0.9% 250 mL .00 mL    Total Intake 700 mL          Specimen:   ID Type Source Tests Collected by Time   1 : RIGHT GREAT TOE Tissue FOOT AMPUTATION RIGHT SURGICAL PATHOLOGY EXAM Windy Santana DPM 11/10/2024 1048   A : RIGHT HALLUX AMPUTATION SITE Swab FOOT AMPUTATION RIGHT TISSUE/WOUND CULTURE/SMEAR Windy Santana DPM 11/10/2024 1034                  Findings: as dictated    Complications:  None; patient tolerated the procedure well.      Disposition: PACU - hemodynamically stable.  Condition: stable  Specimens Collected:   ID Type Source Tests Collected by Time   1 : RIGHT GREAT TOE Tissue FOOT AMPUTATION RIGHT SURGICAL PATHOLOGY EXAM Windy Santana DPM 11/10/2024 1048   A : RIGHT HALLUX AMPUTATION SITE Swab FOOT AMPUTATION RIGHT TISSUE/WOUND CULTURE/SMEAR Windy Santana DPM 11/10/2024 1034     Attending Attestation: I performed the procedure.    Windy Santana  Phone Number: [phone]

## 2024-11-10 NOTE — PROGRESS NOTES
11/10/24 0942   Discharge Planning   Living Arrangements Spouse/significant other;Children   Support Systems Spouse/significant other   Assistance Needed patient is A&Ox3, lives with spouse and children, reports he is independent in ADL's, uses no AD for ambulation, CPAP at HS, drives   Type of Residence Private residence   Number of Stairs Within Residence 20   Do you have animals or pets at home? Yes   Type of Animals or Pets 3 cats, 1 dog   Who is requesting discharge planning? Provider   Home or Post Acute Services In home services   Type of Home Care Services Home nursing visits   Expected Discharge Disposition Home H     11/10/2024 0946: Spoke to patient at bedside, unsure of needs at time of discharge currently but has talked about the possibility of staying at his parents home for a short time as their home is more accessible and they would be home at all times because they are retired. [facility] will follow along as ID and podiatry make plan.

## 2024-11-10 NOTE — PROGRESS NOTES
Vancomycin Dosing by Pharmacy- FOLLOW UP    Twan Camacho is a 54 y.o. year old male who Pharmacy has been consulted for vancomycin dosing for osteomyelitis/septic arthritis. Based on the patient's indication and renal status this patient is being dosed based on a goal AUC of 400-600.     Renal function is currently stable.    Current vancomycin dose: 1500 mg given every 12 hours    Estimated vancomycin AUC on current dose: 331 mg/L.hr     Visit Vitals  /81 (BP Location: Right arm, Patient Position: Lying)   Pulse 74   Temp 36.9 °C (98.4 °F) (Temporal)   Resp 19        Lab Results   Component Value Date    CREATININE 0.89 11/10/2024    CREATININE 1.03 2024    CREATININE 0.73 2021    CREATININE 0.65 2018    CREATININE 0.75 2018    CREATININE 0.93 10/31/2018        Patient weight is as follows:   Vitals:    24 1518   Weight: 126 kg (277 lb 12.5 oz)       Cultures:  No results found for the encounter in last 14 days.       I/O last 3 completed shifts:  In: 950 (7.5 mL/kg) [P.O.:350; IV Piggyback:600]  Out: - (0 mL/kg)   Weight: 126 kg   I/O during current shift:  No intake/output data recorded.    Temp (24hrs), Av.8 °C (98.2 °F), Min:36.7 °C (98.1 °F), Max:36.9 °C (98.4 °F)      Assessment/Plan    Below goal AUC. Orders placed for new vancomcyin regimen of 2000 every 12 hours to begin at 1100 on 11/10/24.     This dosing regimen is predicted by [employer] to result in the following pharmacokinetic parameters:    Regimen: 2000 mg IV every 12 hours.  Start time: 10:19 on 11/10/2024  Exposure target: AUC24 (range)400-600 mg/L.hr   YFM32-30: 441 mg/L.hr  AUC24,ss: 441 mg/L.hr  Probability of AUC24 > 400: 70 %  Ctrough,ss: 5.4 mg/L  Probability of Ctrough,ss > 20: 0 %    The next level will be obtained on 2024 with AM labs. May be obtained sooner if clinically indicated.   Will continue to monitor renal function daily while on vancomycin and order serum creatinine at least every  48 hours if not already ordered.  Follow for continued vancomycin needs, clinical response, and signs/symptoms of toxicity.       Jesenia Cerda, PharmD

## 2024-11-10 NOTE — ANESTHESIA POSTPROCEDURE EVALUATION
Patient: Twan Camacho    Procedure Summary       Date: 11/10/24 Room / Location: [facility] OR [facility] / [facility]    Anesthesia Start: 1016 Anesthesia Stop: 1106    Procedure: Amputation Digit Foot (Right) Diagnosis:       Osteomyelitis      (Osteomyelitis [M86.9])    Surgeons: Windy Santana DPM Responsible Provider: Connie Tang DO    Anesthesia Type: MAC ASA Status: 3 - Emergent            Anesthesia Type: MAC    Vitals Value Taken Time   /82 11/10/24 1106   Temp 36.1 11/10/24 1106   Pulse 81 11/10/24 1106   Resp 16 11/10/24 1106   SpO2 95 11/10/24 1106       Anesthesia Post Evaluation    Patient location during evaluation: PACU  Patient participation: complete - patient participated  Level of consciousness: awake and alert  Pain score: 0  Pain management: satisfactory to patient  Multimodal analgesia pain management approach  Airway patency: patent  Two or more strategies used to mitigate risk of obstructive sleep apnea  Cardiovascular status: acceptable  Respiratory status: acceptable  Hydration status: acceptable  Postoperative Nausea and Vomiting: none        There were no known notable events for this encounter.

## 2024-11-10 NOTE — CONSULTS
Consults  Referred by CHELA Garner MD: Phil Robbins DO    Reason For Consult  Rt 1st toe osteomyelitis    History Of Present Illness  Twan Camacho is a 54 y.o. male, hx of obesity, hx of DM, hx of Rt 1st toeinfection x 2, he was admitted for increasing swelling, redness of the Rt 1st toe / foot x 2 days, no pain, no drainage, no fever, no trauma, the WBC were N, the xray showed bony erosion of the Rt 1st distal phalanx.     Past Medical History  He has a past medical history of Personal history of other endocrine, nutritional and metabolic disease.    Surgical History  He has no past surgical history on file.     Social History     Occupational History    Not on file   Tobacco Use    Smoking status: Never    Smokeless tobacco: Never   Substance and Sexual Activity    Alcohol use: Never    Drug use: Never    Sexual activity: Not on file     Travel History   Travel since 10/09/24    No documented travel since 10/09/24     Family History  No family history on file., no immunodeficiency  Allergies  Patient has no known allergies.     Immunization History   Administered Date(s) Administered    Moderna SARS-CoV-2 Vaccination 02/05/2021, 03/05/2021, 12/13/2021     Medications  Home medications:  Medications Prior to Admission   Medication Sig Dispense Refill Last Dose/Taking    Ozempic 1 mg/dose (4 mg/3 mL) pen injector Inject 1 mg under the skin 1 (one) time per week.        Current medications:  Scheduled medications  enoxaparin, 40 mg, subcutaneous, q12h VANITA  insulin lispro, 0-10 Units, subcutaneous, Before meals & nightly  piperacillin-tazobactam, 3.375 g, intravenous, q6h  vancomycin, 1,500 mg, intravenous, q12h      Continuous medications     PRN medications  PRN medications: acetaminophen, oxyCODONE, oxyCODONE, polyethylene glycol, vancomycin    Review of Systems   All other systems reviewed and are negative.       Objective  Range of Vitals (last 24 hours)  Heart Rate:  [75-97]   Temp:  [36.7 °C (98.1  "°F)]   Resp:  [16-18]   BP: (113-136)/(73-85)   Height:  [167.6 cm (5' 5.98\")-167.6 cm (5' 6\")]   Weight:  [126 kg (277 lb)-126 kg (277 lb 12.5 oz)]   SpO2:  [95 %-98 %]   Daily Weight  11/09/24 : 126 kg (277 lb 12.5 oz)    Body mass index is 44.86 kg/m².     Physical Exam  Constitutional:       Appearance: Normal appearance.   HENT:      Head: Normocephalic and atraumatic.      Mouth/Throat:      Mouth: Mucous membranes are moist.      Pharynx: Oropharynx is clear.   Eyes:      Pupils: Pupils are equal, round, and reactive to light.   Cardiovascular:      Rate and Rhythm: Normal rate and regular rhythm.      Heart sounds: Normal heart sounds.   Pulmonary:      Effort: Pulmonary effort is normal.      Breath sounds: Normal breath sounds.   Abdominal:      General: Abdomen is flat. Bowel sounds are normal.      Palpations: Abdomen is soft.   Musculoskeletal:      Cervical back: Normal range of motion.      Comments: Rt 1st toe swelling / redness, tender, Rt foot edema and redness, edema and redness shin area, DP palpable   Neurological:      Mental Status: He is alert.          Relevant Results  Outside Hospital Results  reviewed  Labs  Results from last 72 hours   Lab Units 11/09/24  1005   WBC AUTO x10*3/uL 9.9   HEMOGLOBIN g/dL 14.4   HEMATOCRIT % 41.5   PLATELETS AUTO x10*3/uL 275   NEUTROS PCT AUTO % 74.4   LYMPHS PCT AUTO % 16.0   MONOS PCT AUTO % 7.9   EOS PCT AUTO % 1.0     Results from last 72 hours   Lab Units 11/09/24  1005   SODIUM mmol/L 133*   POTASSIUM mmol/L 3.8   CHLORIDE mmol/L 101   CO2 mmol/L 27   BUN mg/dL 9   CREATININE mg/dL 1.03   GLUCOSE mg/dL 130*   CALCIUM mg/dL 8.9   ANION GAP mmol/L 9*   EGFR mL/min/1.73m*2 87         Estimated Creatinine Clearance: 105.3 mL/min (by C-G formula based on SCr of 1.03 mg/dL).  C-Reactive Protein   Date Value Ref Range Status   11/09/2024 8.89 (H) <1.00 mg/dL Final     Sedimentation Rate   Date Value Ref Range Status   11/09/2024 10 0 - 20 mm/h Final     No " "results found for: \"HIV1X2\", \"HIVCONF\", \"QJMUOC7NW\"  No results found for: \"HEPCABINIT\", \"HEPCAB\", \"HCVPCRQUANT\"  Microbiology  Reviewed  Imaging  Reviewed      Assessment/Plan     Right 1st toe infection / osteomyelitis / Rt foot cellulitis    Recommendations :  Continue Zosyn and Vancomycin  Cultures  Vascular evaluation  Keep the leg elevated  Wound care  Will need surgery    I spent minutes in the professional and overall care of this patient.      Norbert Coughlin MD  "

## 2024-11-10 NOTE — CONSULTS
Inpatient consult to Podiatry  Consult performed by: Windy Santana DPM  Consult ordered by: Pancho Garner DO  Reason for consult: Right hallux osteomyelitis          Reason For Consult  Right hallux osteomyelitis    History Of Present Illness  Twan Camacho is a 54 y.o. male presenting with pain, swelling and redness to right great toe.  Long standing history of wound to right great toe that has healed with oral antibiotics.  Relates that he is diabetic and that it is very well controlled.  Does admit that that pain and increased swelling started earlier this week.  Has followed with podiatry in [place] recently.  X-rays last year did not show any kole changes.  Current x-rays show destruction of distal phalanx right hallux.  Does have remote history of trauma to right foot.     Past Medical History  He has a past medical history of Personal history of other endocrine, nutritional and metabolic disease.    Surgical History  He has no past surgical history on file.     Social History  He reports that he has never smoked. He has never used smokeless tobacco. He reports that he does not drink alcohol and does not use drugs.    Family History  No family history on file.     Allergies  Patient has no known allergies.    Review of Systems   +peripheral neuropathy  +right great toe pain  +LE swelling    Physical Exam   Constitutional:       General: He is not in acute distress.  Cardiovascular:      Rate and Rhythm: Normal rate and regular rhythm.      Heart sounds: No murmur heard.  Pulmonary:      Effort: Pulmonary effort is normal. No respiratory distress.      Breath sounds: Normal breath sounds.   Abdominal:      General: Abdomen is flat. Bowel sounds are normal. There is no distension.      Tenderness: There is no abdominal tenderness.   Musculoskeletal:      Right lower leg: Edema present.      Left lower leg: No edema.      Comments: erythema and edema to right forefoot;  right hallux edematous with  "fluctuance noted;  palpable pedal pulses B/L;  decreased sensation to light touch.   Skin:     General: Skin is warm and dry.   Neurological:      General: No focal deficit present.      Mental Status: He is alert and oriented to person, place, and time.     Last Recorded Vitals  Blood pressure 154/81, pulse 74, temperature 36.9 °C (98.4 °F), temperature source Temporal, resp. rate 19, height 1.676 m (5' 5.98\"), weight 126 kg (277 lb 12.5 oz), SpO2 95%.    Relevant Results  XR tibia fibula right 2 views    Result Date: 11/9/2024  Interpreted By:  Johnathon Perry, STUDY: XR TIBIA FIBULA RIGHT 2 VIEWS; 11/9/2024 10:19 am   INDICATION: Signs/Symptoms:right foot/leg red and swollen.   COMPARISON: None.   ACCESSION NUMBER(S): [id number]   ORDERING CLINICIAN: DOLORES JADE   TECHNIQUE: Two views of the right tibia and fibula   FINDINGS: No fracture or dislocation is evident. Surgical and degenerative changes seen of the knee. There is calcaneal enthesophyte formation. Varicose veins are seen in the subcutaneous tissues. There is some heterotopic ossification in the calf. Vascular calcifications are also seen in the soft tissues. No soft tissue gas is evident.       As above without osseous injury evident.   Signed by: Johnathon Perry 11/9/2024 10:35 AM Dictation workstation:   [id number]    XR foot right 3+ views    Result Date: 11/9/2024  Interpreted By:  Johnathon Perry, STUDY: Right foot dated  11/9/2024.   INDICATION: Signs/Symptoms:right foot and Great toe red and swollen   COMPARISON: Radiographs dated 04/02/2023.   ACCESSION NUMBER(S): [id number]   ORDERING CLINICIAN: DOLORES JADE   TECHNIQUE: Three views of the  right foot.   FINDINGS: There is destruction the majority of the tuft and diaphysis of the distal phalanx of the 1st digit with comminuted fragmentation and some destruction of the base.  There is an os naviculare. Degenerative changes seen of the midfoot. There is calcaneal enthesophyte formation.   " No ankle joint effusion is evident. Soft tissue swelling is seen of the 1st digit.       Findings most compatible with cellulitis with osteomyelitis with destruction of the distal phalanx of the 1st digit with fragmentation of the proximal portion of the distal phalanx. MRI may be helpful to further characterize extent of process.   MACRO: None   Signed by: Johnathon Perry 11/9/2024 10:34 AM Dictation workstation:   [id number]   Results for orders placed or performed during the hospital encounter of 11/09/24 (from the past 24 hours)   POCT GLUCOSE   Result Value Ref Range    POCT Glucose 114 (H) 74 - 99 mg/dL   CBC   Result Value Ref Range    WBC 9.9 4.4 - 11.3 x10*3/uL    nRBC 0.0 0.0 - 0.0 /100 WBCs    RBC 4.65 4.50 - 5.90 x10*6/uL    Hemoglobin 14.5 13.5 - 17.5 g/dL    Hematocrit 44.2 41.0 - 52.0 %    MCV 95 80 - 100 fL    MCH 31.2 26.0 - 34.0 pg    MCHC 32.8 32.0 - 36.0 g/dL    RDW 12.2 11.5 - 14.5 %    Platelets 258 150 - 450 x10*3/uL   Renal function panel   Result Value Ref Range    Glucose 107 (H) 74 - 99 mg/dL    Sodium 134 (L) 136 - 145 mmol/L    Potassium 3.7 3.5 - 5.3 mmol/L    Chloride 100 98 - 107 mmol/L    Bicarbonate 24 21 - 32 mmol/L    Anion Gap 14 10 - 20 mmol/L    Urea Nitrogen 10 6 - 23 mg/dL    Creatinine 0.89 0.50 - 1.30 mg/dL    eGFR >90 >60 mL/min/1.73m*2    Calcium 8.7 8.6 - 10.3 mg/dL    Phosphorus 3.8 2.5 - 4.9 mg/dL    Albumin 3.4 3.4 - 5.0 g/dL   Magnesium   Result Value Ref Range    Magnesium 1.87 1.60 - 2.40 mg/dL   Vancomycin   Result Value Ref Range    Vancomycin 11.0 5.0 - 20.0 ug/mL   POCT GLUCOSE   Result Value Ref Range    POCT Glucose 108 (H) 74 - 99 mg/dL        Assessment/Plan     Right hallux osteomyelitis   - will need right hallux amputation. NPO.  Plan for this AM.  May need to leave open pending amount of proximal purulence encountered.   - IV antibiotics per ID.   - Will follow.    I spent 30 minutes in the professional and overall care of this patient.

## 2024-11-10 NOTE — PROGRESS NOTES
Twan Camacho is a 54 y.o. male on day 1 of admission presenting with Osteomyelitis.      Subjective   Pt was seen and examined today, still have pain, redness, increased warmth in right great toe and mid half of right foot . currently on room air.  Denied any fever, chill or any other events overnight.  No acute events overnight.         Objective     Last Recorded Vitals  /69   Pulse 84   Temp 36.1 °C (97 °F)   Resp 18   Wt 126 kg (277 lb 12.5 oz)   SpO2 94%   Intake/Output last 3 Shifts:    Intake/Output Summary (Last 24 hours) at 11/10/2024 1258  Last data filed at 11/10/2024 1106  Gross per 24 hour   Intake 2294 ml   Output --   Net 2294 ml       Admission Weight  Weight: 126 kg (277 lb 12.5 oz) (11/09/24 1518)    Daily Weight  11/09/24 : 126 kg (277 lb 12.5 oz)    Image Results  XR tibia fibula right 2 views  Narrative: Interpreted By:  Johnathon Perry,   STUDY:  XR TIBIA FIBULA RIGHT 2 VIEWS; 11/9/2024 10:19 am      INDICATION:  Signs/Symptoms:right foot/leg red and swollen.      COMPARISON:  None.      ACCESSION NUMBER(S):  [id number]      ORDERING CLINICIAN:  DOLORES JADE      TECHNIQUE:  Two views of the right tibia and fibula      FINDINGS:  No fracture or dislocation is evident. Surgical and degenerative  changes seen of the knee. There is calcaneal enthesophyte formation.  Varicose veins are seen in the subcutaneous tissues. There is some  heterotopic ossification in the calf. Vascular calcifications are  also seen in the soft tissues. No soft tissue gas is evident.      Impression: As above without osseous injury evident.      Signed by: Johnathon Perry 11/9/2024 10:35 AM  Dictation workstation:   [id number]  XR foot right 3+ views  Narrative: Interpreted By:  Johnathon Perry,   STUDY:  Right foot dated  11/9/2024.      INDICATION:  Signs/Symptoms:right foot and Great toe red and swollen      COMPARISON:  Radiographs dated 04/02/2023.      ACCESSION NUMBER(S):  [id number]       ORDERING CLINICIAN:  DOLORES JADE      TECHNIQUE:  Three views of the  right foot.      FINDINGS:  There is destruction the majority of the tuft and diaphysis of the  distal phalanx of the 1st digit with comminuted fragmentation and  some destruction of the base.  There is an os naviculare.  Degenerative changes seen of the midfoot. There is calcaneal  enthesophyte formation.   No ankle joint effusion is evident. Soft  tissue swelling is seen of the 1st digit.      Impression: Findings most compatible with cellulitis with osteomyelitis with  destruction of the distal phalanx of the 1st digit with fragmentation  of the proximal portion of the distal phalanx. MRI may be helpful to  further characterize extent of process.      MACRO:  None      Signed by: Johnathon Perry 11/9/2024 10:34 AM  Dictation workstation:   [id number]      Physical Exam  Constitutional:       Appearance: He is obese.   Cardiovascular:      Rate and Rhythm: Normal rate and regular rhythm.      Pulses: Normal pulses.      Heart sounds: Normal heart sounds.   Pulmonary:      Effort: Pulmonary effort is normal.      Breath sounds: Normal breath sounds. No wheezing or rales.   Abdominal:      General: Bowel sounds are normal.      Palpations: Abdomen is soft.   Musculoskeletal:         General: Swelling (Right great toe.) and tenderness (Right great toe) present.   Skin:     General: Skin is warm.      Findings: Erythema present.      Comments: Right midfoot erythema, with increased warmth, right great toe swollen, no active discharge notified, patient is able to move his toes.    Neurological:      Mental Status: He is alert. Mental status is at baseline.         Relevant Results  Results for orders placed or performed during the hospital encounter of 11/09/24 (from the past 24 hours)   POCT GLUCOSE   Result Value Ref Range    POCT Glucose 114 (H) 74 - 99 mg/dL   CBC   Result Value Ref Range    WBC 9.9 4.4 - 11.3 x10*3/uL    nRBC 0.0 0.0 - 0.0  /100 WBCs    RBC 4.65 4.50 - 5.90 x10*6/uL    Hemoglobin 14.5 13.5 - 17.5 g/dL    Hematocrit 44.2 41.0 - 52.0 %    MCV 95 80 - 100 fL    MCH 31.2 26.0 - 34.0 pg    MCHC 32.8 32.0 - 36.0 g/dL    RDW 12.2 11.5 - 14.5 %    Platelets 258 150 - 450 x10*3/uL   Renal function panel   Result Value Ref Range    Glucose 107 (H) 74 - 99 mg/dL    Sodium 134 (L) 136 - 145 mmol/L    Potassium 3.7 3.5 - 5.3 mmol/L    Chloride 100 98 - 107 mmol/L    Bicarbonate 24 21 - 32 mmol/L    Anion Gap 14 10 - 20 mmol/L    Urea Nitrogen 10 6 - 23 mg/dL    Creatinine 0.89 0.50 - 1.30 mg/dL    eGFR >90 >60 mL/min/1.73m*2    Calcium 8.7 8.6 - 10.3 mg/dL    Phosphorus 3.8 2.5 - 4.9 mg/dL    Albumin 3.4 3.4 - 5.0 g/dL   Magnesium   Result Value Ref Range    Magnesium 1.87 1.60 - 2.40 mg/dL   Vancomycin   Result Value Ref Range    Vancomycin 11.0 5.0 - 20.0 ug/mL   POCT GLUCOSE   Result Value Ref Range    POCT Glucose 108 (H) 74 - 99 mg/dL   POCT GLUCOSE   Result Value Ref Range    POCT Glucose 156 (H) 74 - 99 mg/dL                 Assessment & Plan  Osteomyelitis    Twan Camacho is a 54 y.o. male with PMH of Type 2 DM, HTN, HLD, and presenting as a transfer from [place] ED for concern of right toe osteomyelitis.     Acute Medical Issues:    # Right toe and foot cellulitis c/b right toe osteomyelitis  # S/P Right hallux amputation with removal of 1st metatarsal head and sesamoids. 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  - Podiatry on board, Had Right hallux amputation with removal of 1st metatarsal head and sesamoids, today AM by Dr LUZ , recommended continue IV antibiotic as per ID  - wound care consulted.     PLAN:  - Continue IV Vanc and zosyn  - follow BC and tissue culture.   - ID on board.  Recommended vascular evaluation, keep the leg  elevated, wound care.   - Pain management Tylenol 650 every 6 hrs for mild, oxycodone 5 mg every 6 hrs prn for moderate and oxycodone 10 mg every 6 as needed for severe pain.     HTN (hypertension)  - not on any meds   Diabetes mellitus, type 2 (Multi)  - well-controlled, A1C 5.8 in August 2024. Holding home ozempic. On  SSI     F: None  E: Replete as needed  N: Regular diet  Abx: vanc and zosyn  GI ppx: None  DVT ppx: Lovenox subcutaneous. Will hold tmrw am for possible surgery  Tubes/Lines/Drains: None     Code status: Full Code  Dispo: 54 y.o. male admitted for right toe osteomyelitis. S/P Right hallux amputation with removal of 1st metatarsal head and sesamoids. POD 0 Anticipate LOS > 48 hrs.     Rosamaria Klein MD

## 2024-11-11 LAB
ALBUMIN SERPL BCP-MCNC: 3.2 G/DL (ref 3.4–5)
ANION GAP SERPL CALC-SCNC: 12 MMOL/L (ref 10–20)
BUN SERPL-MCNC: 11 MG/DL (ref 6–23)
CALCIUM SERPL-MCNC: 8.2 MG/DL (ref 8.6–10.3)
CHLORIDE SERPL-SCNC: 104 MMOL/L (ref 98–107)
CO2 SERPL-SCNC: 23 MMOL/L (ref 21–32)
CREAT SERPL-MCNC: 0.78 MG/DL (ref 0.5–1.3)
EGFRCR SERPLBLD CKD-EPI 2021: >90 ML/MIN/1.73M*2
ERYTHROCYTE [DISTWIDTH] IN BLOOD BY AUTOMATED COUNT: 11.9 % (ref 11.5–14.5)
GLUCOSE BLD MANUAL STRIP-MCNC: 100 MG/DL (ref 74–99)
GLUCOSE BLD MANUAL STRIP-MCNC: 161 MG/DL (ref 74–99)
GLUCOSE BLD MANUAL STRIP-MCNC: 187 MG/DL (ref 74–99)
GLUCOSE BLD MANUAL STRIP-MCNC: 94 MG/DL (ref 74–99)
GLUCOSE SERPL-MCNC: 95 MG/DL (ref 74–99)
HCT VFR BLD AUTO: 41 % (ref 41–52)
HGB BLD-MCNC: 13.7 G/DL (ref 13.5–17.5)
MAGNESIUM SERPL-MCNC: 1.96 MG/DL (ref 1.6–2.4)
MCH RBC QN AUTO: 31.1 PG (ref 26–34)
MCHC RBC AUTO-ENTMCNC: 33.4 G/DL (ref 32–36)
MCV RBC AUTO: 93 FL (ref 80–100)
NRBC BLD-RTO: 0 /100 WBCS (ref 0–0)
PHOSPHATE SERPL-MCNC: 4.1 MG/DL (ref 2.5–4.9)
PLATELET # BLD AUTO: 250 X10*3/UL (ref 150–450)
POTASSIUM SERPL-SCNC: 4 MMOL/L (ref 3.5–5.3)
RBC # BLD AUTO: 4.4 X10*6/UL (ref 4.5–5.9)
SODIUM SERPL-SCNC: 135 MMOL/L (ref 136–145)
WBC # BLD AUTO: 9.7 X10*3/UL (ref 4.4–11.3)

## 2024-11-11 PROCEDURE — 82565 ASSAY OF CREATININE: CPT | Performed by: PODIATRIST

## 2024-11-11 PROCEDURE — 1100000001 HC PRIVATE ROOM DAILY

## 2024-11-11 PROCEDURE — 2500000004 HC RX 250 GENERAL PHARMACY W/ HCPCS (ALT 636 FOR OP/ED): Performed by: PODIATRIST

## 2024-11-11 PROCEDURE — 2500000004 HC RX 250 GENERAL PHARMACY W/ HCPCS (ALT 636 FOR OP/ED)

## 2024-11-11 PROCEDURE — 2500000005 HC RX 250 GENERAL PHARMACY W/O HCPCS

## 2024-11-11 PROCEDURE — 2500000004 HC RX 250 GENERAL PHARMACY W/ HCPCS (ALT 636 FOR OP/ED): Performed by: INTERNAL MEDICINE

## 2024-11-11 PROCEDURE — 83735 ASSAY OF MAGNESIUM: CPT | Performed by: PODIATRIST

## 2024-11-11 PROCEDURE — 85027 COMPLETE CBC AUTOMATED: CPT | Performed by: PODIATRIST

## 2024-11-11 PROCEDURE — 2500000005 HC RX 250 GENERAL PHARMACY W/O HCPCS: Performed by: INTERNAL MEDICINE

## 2024-11-11 PROCEDURE — 2500000001 HC RX 250 WO HCPCS SELF ADMINISTERED DRUGS (ALT 637 FOR MEDICARE OP): Performed by: PODIATRIST

## 2024-11-11 PROCEDURE — 94760 N-INVAS EAR/PLS OXIMETRY 1: CPT

## 2024-11-11 PROCEDURE — 99232 SBSQ HOSP IP/OBS MODERATE 35: CPT | Performed by: INTERNAL MEDICINE

## 2024-11-11 PROCEDURE — 36415 COLL VENOUS BLD VENIPUNCTURE: CPT | Performed by: PODIATRIST

## 2024-11-11 PROCEDURE — 82947 ASSAY GLUCOSE BLOOD QUANT: CPT

## 2024-11-11 RX ADMIN — VANCOMYCIN HYDROCHLORIDE 1500 MG: 5 INJECTION, POWDER, LYOPHILIZED, FOR SOLUTION INTRAVENOUS at 17:22

## 2024-11-11 RX ADMIN — ENOXAPARIN SODIUM 40 MG: 100 INJECTION SUBCUTANEOUS at 15:46

## 2024-11-11 RX ADMIN — VANCOMYCIN HYDROCHLORIDE 2000 MG: 10 INJECTION, POWDER, LYOPHILIZED, FOR SOLUTION INTRAVENOUS at 10:39

## 2024-11-11 RX ADMIN — PIPERACILLIN SODIUM AND TAZOBACTAM SODIUM 3.38 G: 3; .375 INJECTION, SOLUTION INTRAVENOUS at 09:13

## 2024-11-11 RX ADMIN — PIPERACILLIN SODIUM AND TAZOBACTAM SODIUM 3.38 G: 3; .375 INJECTION, SOLUTION INTRAVENOUS at 21:24

## 2024-11-11 RX ADMIN — ENOXAPARIN SODIUM 40 MG: 100 INJECTION SUBCUTANEOUS at 02:00

## 2024-11-11 RX ADMIN — PIPERACILLIN SODIUM AND TAZOBACTAM SODIUM 3.38 G: 3; .375 INJECTION, SOLUTION INTRAVENOUS at 15:46

## 2024-11-11 RX ADMIN — PIPERACILLIN SODIUM AND TAZOBACTAM SODIUM 3.38 G: 3; .375 INJECTION, SOLUTION INTRAVENOUS at 03:30

## 2024-11-11 RX ADMIN — ACETAMINOPHEN 650 MG: 325 TABLET ORAL at 02:33

## 2024-11-11 ASSESSMENT — COGNITIVE AND FUNCTIONAL STATUS - GENERAL
MOBILITY SCORE: 23
WALKING IN HOSPITAL ROOM: A LITTLE
DAILY ACTIVITIY SCORE: 24
DAILY ACTIVITIY SCORE: 24
CLIMB 3 TO 5 STEPS WITH RAILING: A LITTLE
MOVING TO AND FROM BED TO CHAIR: A LITTLE
STANDING UP FROM CHAIR USING ARMS: A LITTLE
MOBILITY SCORE: 20
CLIMB 3 TO 5 STEPS WITH RAILING: A LITTLE

## 2024-11-11 ASSESSMENT — PAIN SCALES - GENERAL: PAINLEVEL_OUTOF10: 0 - NO PAIN

## 2024-11-11 NOTE — PROGRESS NOTES
"Twan Camacho is a 54 y.o. male on day 2 of admission presenting with Osteomyelitis.    Subjective   POD#1 s/p right hallux and first metatarsal head amputation.  Pain controlled.  No other pedal complaints.    Meds:  Scheduled medications  enoxaparin, 40 mg, subcutaneous, q12h VANITA  insulin lispro, 0-10 Units, subcutaneous, Before meals & nightly  piperacillin-tazobactam, 3.375 g, intravenous, q6h  vancomycin, 2,000 mg, intravenous, q12h      Continuous medications     PRN medications  PRN medications: acetaminophen, oxyCODONE, oxyCODONE, polyethylene glycol, vancomycin       Physical Exam   Constitutional:       General: He is not in acute distress.  Cardiovascular:      Rate and Rhythm: Normal rate and regular rhythm.      Heart sounds: No murmur heard.  Pulmonary:      Effort: Pulmonary effort is normal. No respiratory distress.      Breath sounds: Normal breath sounds.   Abdominal:      General: Abdomen is flat. Bowel sounds are normal. There is no distension.      Tenderness: There is no abdominal tenderness.   Musculoskeletal:      Right lower leg: Edema present.      Left lower leg: No edema.      Comments: erythema and edema to right forefoot is decreased;  surgically absent right hallux with well coapted surgical incision with sutures intact;  no active drainage;   Skin:     General: Skin is warm and dry.   Neurological:      General: No focal deficit present.      Mental Status: He is alert and oriented to person, place, and time.     Last Recorded Vitals  Blood pressure 115/74, pulse 85, temperature 36.1 °C (97 °F), temperature source Temporal, resp. rate 16, height 1.676 m (5' 5.98\"), weight 126 kg (277 lb 12.5 oz), SpO2 94%.    Intake/Output last 3 Shifts:  I/O last 3 completed shifts:  In: 2964 (23.5 mL/kg) [P.O.:560; I.V.:4 (0 mL/kg); IV Piggyback:2400]  Out: - (0 mL/kg)   Weight: 126 kg     Relevant Results   Results for orders placed or performed during the hospital encounter of 11/09/24 (from the " past 24 hours)   Tissue/Wound Culture/Smear    Specimen: FOOT AMPUTATION RIGHT; Swab   Result Value Ref Range    Gram Stain (2+) Few Polymorphonuclear leukocytes (A)     Gram Stain (2+) Few Gram positive pleomorphic bacilli (A)    POCT GLUCOSE   Result Value Ref Range    POCT Glucose 156 (H) 74 - 99 mg/dL   POCT GLUCOSE   Result Value Ref Range    POCT Glucose 153 (H) 74 - 99 mg/dL   POCT GLUCOSE   Result Value Ref Range    POCT Glucose 151 (H) 74 - 99 mg/dL   CBC   Result Value Ref Range    WBC 9.7 4.4 - 11.3 x10*3/uL    nRBC 0.0 0.0 - 0.0 /100 WBCs    RBC 4.40 (L) 4.50 - 5.90 x10*6/uL    Hemoglobin 13.7 13.5 - 17.5 g/dL    Hematocrit 41.0 41.0 - 52.0 %    MCV 93 80 - 100 fL    MCH 31.1 26.0 - 34.0 pg    MCHC 33.4 32.0 - 36.0 g/dL    RDW 11.9 11.5 - 14.5 %    Platelets 250 150 - 450 x10*3/uL   Renal function panel   Result Value Ref Range    Glucose 95 74 - 99 mg/dL    Sodium 135 (L) 136 - 145 mmol/L    Potassium 4.0 3.5 - 5.3 mmol/L    Chloride 104 98 - 107 mmol/L    Bicarbonate 23 21 - 32 mmol/L    Anion Gap 12 10 - 20 mmol/L    Urea Nitrogen 11 6 - 23 mg/dL    Creatinine 0.78 0.50 - 1.30 mg/dL    eGFR >90 >60 mL/min/1.73m*2    Calcium 8.2 (L) 8.6 - 10.3 mg/dL    Phosphorus 4.1 2.5 - 4.9 mg/dL    Albumin 3.2 (L) 3.4 - 5.0 g/dL   Magnesium   Result Value Ref Range    Magnesium 1.96 1.60 - 2.40 mg/dL   MR foot right w and wo IV contrast    Result Date: 11/10/2024  Interpreted By:  Johnathon Perry, STUDY: MRI of the    right forefoot without and with contrast dated 11/10/2024.   INDICATION: Signs/Symptoms:C/f osteomyelitis   COMPARISON: None. Correlation is made with the 11/09/2024 radiographs.   ACCESSION NUMBER(S): [id number]   ORDERING CLINICIAN: KEAGAN CARO   TECHNIQUE: Multiplanar multisequence MRI of the    right forefoot was performed without and with intravenous gadolinium based contrast.   FINDINGS: OSSEOUS STRUCTURES AND JOINTS:   There is  dorsal subluxation of the 2nd digit at the  metatarsophalangeal joint. There is mild subluxation at the 3rd digit metatarsophalangeal joint. In the interval since the radiographs there has been amputation of the 1st ray at the level of the head of the 1st metatarsal including the hallux sesamoid bones. There is some mild increased T2 signal intensity in the distal 1st metatarsal at the amputation margin with some faint low T1 signal intensity and faint increased T1 signal intensity after the administration of contrast. There is some patchy faint increased STIR signal intensity seen in bones in the midfoot and at the bases of the 2nd and 3rd metatarsals without confluence low T1 signal intensity or macrotrabecular fracture line evident. Bone marrow signal intensity is otherwise within normal limits. Mild degenerative changes seen in the midfoot. Mild degenerative changes seen the 2nd digit and 3rd digit metatarsophalangeal joint.   SOFT TISSUES:   Amputation changes are seen to the muscles tendons and ligaments to the 1st ray. Generalized atrophy is seen in the musculature. Diffuse ill-defined increased T2/STIR signal intensity is seen in the musculature. Reticular increased T2 signal intensity with mild reticular post-contrast enhancement is seen in the subcutaneous tissues greatest on the dorsum of the foot. There is a confluence area of increased T2 signal intensity with decreased T1 signal intensity precontrast within the amputation bed of the 1st ray. No definitive rim enhancement is evident. There are some foci of susceptibility artifact within the amputation bed likely related to gas from the recent amputation surgery.       1. Amputation of the 1st ray at the level of the head of the 1st metatarsal along with its sesamoid bones. There is some signal changes in the distal 1st metatarsal at the amputation margin most compatible with reactive changes related to the surgery although subtle osteomyelitis is not absolutely excluded. 2. Small volume of fluid  and likely gas in the amputation bed which is felt to be secondary to the surgery without you rim enhancement evident. 3. Subluxation dorsally at the 2nd and 3rd digit metatarsophalangeal joints,, greater at the 2nd digit metatarsophalangeal joint. 4. Findings likely representing a combination of cellulitis and lymphedema of the visualized lower extremity. 5. Findings most compatible with ischemic/diabetic myopathy.   Signed by: Johnathon Perry 11/10/2024 2:26 PM Dictation workstation:   [id number]    XR tibia fibula right 2 views    Result Date: 11/9/2024  Interpreted By:  Johnathon Perry, STUDY: XR TIBIA FIBULA RIGHT 2 VIEWS; 11/9/2024 10:19 am   INDICATION: Signs/Symptoms:right foot/leg red and swollen.   COMPARISON: None.   ACCESSION NUMBER(S): [id number]   ORDERING CLINICIAN: DOLORES JADE   TECHNIQUE: Two views of the right tibia and fibula   FINDINGS: No fracture or dislocation is evident. Surgical and degenerative changes seen of the knee. There is calcaneal enthesophyte formation. Varicose veins are seen in the subcutaneous tissues. There is some heterotopic ossification in the calf. Vascular calcifications are also seen in the soft tissues. No soft tissue gas is evident.       As above without osseous injury evident.   Signed by: Johnathon Perry 11/9/2024 10:35 AM Dictation workstation:   [id number]    XR foot right 3+ views    Result Date: 11/9/2024  Interpreted By:  Johnathon Perry, STUDY: Right foot dated  11/9/2024.   INDICATION: Signs/Symptoms:right foot and Great toe red and swollen   COMPARISON: Radiographs dated 04/02/2023.   ACCESSION NUMBER(S): [id number]   ORDERING CLINICIAN: DOLORES JADE   TECHNIQUE: Three views of the  right foot.   FINDINGS: There is destruction the majority of the tuft and diaphysis of the distal phalanx of the 1st digit with comminuted fragmentation and some destruction of the base.  There is an os naviculare. Degenerative changes seen of the midfoot. There is  calcaneal enthesophyte formation.   No ankle joint effusion is evident. Soft tissue swelling is seen of the 1st digit.       Findings most compatible with cellulitis with osteomyelitis with destruction of the distal phalanx of the 1st digit with fragmentation of the proximal portion of the distal phalanx. MRI may be helpful to further characterize extent of process.   MACRO: None   Signed by: Johnathon Perry 11/9/2024 10:34 AM Dictation workstation:   [id number]        Assessment/Plan   Assessment & Plan  Osteomyelitis    HTN (hypertension)    Diabetes mellitus, type 2 (Multi)    Obesity    MARGARET (obstructive sleep apnea)     Right hallux osteomyelitis - POD#1 s/p right hallux and first metatarsal head excision  Dressing changed with adaptic, 4x4's, kerlix and ace bandage.  Post-op shoe dispensed.  Up to bathroom, otherwise, keep foot elevated.  IV antibiotics per ID.  Awaiting OR cultures.  Will follow.       Windy Santana DPM

## 2024-11-11 NOTE — PROGRESS NOTES
11/11/24 1400   Advance Directives (For Healthcare)   Have you reviewed your Advance Directive and is it valid for this stay? Yes   Advance Directive Patient would like information;Patient does not have advance directive   Information Provided on Healthcare Directives Yes     SW met with pt regarding POA; pt is interested in completing documents.  SW provided information; followed up with pt who was resting.  SW will follow to complete forms.

## 2024-11-11 NOTE — PROGRESS NOTES
11/11/24 1047   Discharge Planning   Living Arrangements Spouse/significant other   Support Systems Spouse/significant other   Assistance Needed patient is A&Ox3, lives with spouse and children, reports he is independent in ADL's, uses no AD for ambulation, CPAP at HS, drives   Type of Residence Private residence   Number of Stairs Within Residence 20   Do you have animals or pets at home? Yes   Type of Animals or Pets 3 cats; 1 dog   Who is requesting discharge planning? Provider   Home or Post Acute Services In home services   Type of Home Care Services Home nursing visits   Expected Discharge Disposition Home H  (Home with new [facility]. Patient will need new [facility] for SN for wound care to right foot. Await culture results, may need IV infusions. Patient unsure if he will stay at his house or with his father in [place]. Either home is in [facility].)   Does the patient need discharge transport arranged? No   RoundTrip coordination needed? No   Has discharge transport been arranged? No   Patient Choice   Provider Choice list and CMS website (https://medicare.gov/care-compare#search) for post-acute Quality and Resource Measure Data were provided and reviewed with: Patient

## 2024-11-11 NOTE — PROGRESS NOTES
Twan Camacho is a 54 y.o. male on day 2 of admission presenting with Osteomyelitis.    Subjective   Interval History: no fever, no new complaints         Review of Systems    Objective   Range of Vitals (last 24 hours)  Heart Rate:  [79-85]   Temp:  [36.1 °C (97 °F)-37 °C (98.6 °F)]   Resp:  [16]   BP: (115-143)/(74-82)   SpO2:  [94 %-97 %]   Daily Weight  11/09/24 : 126 kg (277 lb 12.5 oz)    Body mass index is 44.86 kg/m².    Physical Exam  Constitutional:       Appearance: Normal appearance.   HENT:      Head: Normocephalic and atraumatic.      Mouth/Throat:      Mouth: Mucous membranes are moist.      Pharynx: Oropharynx is clear.   Eyes:      Pupils: Pupils are equal, round, and reactive to light.   Cardiovascular:      Rate and Rhythm: Normal rate and regular rhythm.      Heart sounds: Normal heart sounds.   Pulmonary:      Effort: Pulmonary effort is normal.      Breath sounds: Normal breath sounds.   Abdominal:      General: Abdomen is flat. Bowel sounds are normal.      Palpations: Abdomen is soft.   Musculoskeletal:      Cervical back: Normal range of motion.      Comments: Rt foot dressing   Neurological:      Mental Status: He is alert.         Antibiotics  piperacillin-tazobactam - 3.375 gram/50 mL  vancomycin  vancomycin piggyback - 2000 mg/500 ml    Relevant Results  Labs  Results from last 72 hours   Lab Units 11/11/24  0610 11/10/24  0532 11/09/24  1005   WBC AUTO x10*3/uL 9.7 9.9 9.9   HEMOGLOBIN g/dL 13.7 14.5 14.4   HEMATOCRIT % 41.0 44.2 41.5   PLATELETS AUTO x10*3/uL 250 258 275   NEUTROS PCT AUTO %  --   --  74.4   LYMPHS PCT AUTO %  --   --  16.0   MONOS PCT AUTO %  --   --  7.9   EOS PCT AUTO %  --   --  1.0     Results from last 72 hours   Lab Units 11/11/24  0610 11/10/24  0532 11/09/24  1005   SODIUM mmol/L 135* 134* 133*   POTASSIUM mmol/L 4.0 3.7 3.8   CHLORIDE mmol/L 104 100 101   CO2 mmol/L 23 24 27   BUN mg/dL 11 10 9   CREATININE mg/dL 0.78 0.89 1.03   GLUCOSE mg/dL 95 107* 130*    CALCIUM mg/dL 8.2* 8.7 8.9   ANION GAP mmol/L 12 14 9*   EGFR mL/min/1.73m*2 >90 >90 87   PHOSPHORUS mg/dL 4.1 3.8  --      Results from last 72 hours   Lab Units 11/11/24  0610 11/10/24  0532   ALBUMIN g/dL 3.2* 3.4     Estimated Creatinine Clearance: 125 mL/min (by C-G formula based on SCr of 0.78 mg/dL).  C-Reactive Protein   Date Value Ref Range Status   11/09/2024 8.89 (H) <1.00 mg/dL Final     Microbiology  Reviewed  Imaging  Reviewed        Assessment/Plan      Right 1st toe infection / osteomyelitis / Rt foot cellulitis, sp toe amputation, the culture is pending     Recommendations :  Continue Zosyn and Vancomycin  Discussed with the surgery team    I spent minutes in the professional and overall care of this patient.      Norbert Coughlin MD

## 2024-11-11 NOTE — PROGRESS NOTES
Vancomycin Dosing by Pharmacy- FOLLOW UP    Twan Camacho is a 54 y.o. year old male who Pharmacy has been consulted for vancomycin dosing for osteomyelitis/septic arthritis. Based on the patient's indication and renal status this patient is being dosed based on a goal AUC of 400-600.     Renal function is currently stable.    Current vancomycin dose: 2000 mg given every 12 hours    Estimated vancomycin AUC on current dose: 388 mg/L.hr     Visit Vitals  /74 (BP Location: Left arm, Patient Position: Lying)   Pulse 85   Temp 36.1 °C (97 °F) (Temporal)   Resp 16        Lab Results   Component Value Date    CREATININE 0.78 2024    CREATININE 0.89 11/10/2024    CREATININE 1.03 2024    CREATININE 0.73 2021    CREATININE 0.65 2018    CREATININE 0.75 2018    CREATININE 0.93 10/31/2018        Patient weight is as follows:   Vitals:    24 1518   Weight: 126 kg (277 lb 12.5 oz)       Cultures:  No results found for the encounter in last 14 days.       I/O last 3 completed shifts:  In: 2964 (23.5 mL/kg) [P.O.:560; I.V.:4 (0 mL/kg); IV Piggyback:2400]  Out: - (0 mL/kg)   Weight: 126 kg   I/O during current shift:  No intake/output data recorded.    Temp (24hrs), Av.6 °C (97.8 °F), Min:36.1 °C (97 °F), Max:37 °C (98.6 °F)      Assessment/Plan    Below goal AUC. Orders placed for new vancomcyin regimen of 1500 every 8 hours to begin at  1700.     This dosing regimen is predicted by [facility] [employer] to result in the following pharmacokinetic parameters:  Loading dose: N/A  Regimen: 1500 mg IV every 8 hours.  Start time: 22:39 on 2024  Exposure target: AUC24 (range)400-600 mg/L.hr   EOQ23-03: 436 mg/L.hr  AUC24,ss: 436 mg/L.hr  Probability of AUC24 > 400: 67 %  Ctrough,ss: 8 mg/L  Probability of Ctrough,ss > 20: 0 %      The next level will be obtained on  at am labs. May be obtained sooner if clinically indicated.   Will continue to monitor renal function daily while on  vancomycin and order serum creatinine at least every 48 hours if not already ordered.  Follow for continued vancomycin needs, clinical response, and signs/symptoms of toxicity.       Joe Reyna, PharmD  PGY1 Pharmacy Resident

## 2024-11-11 NOTE — HOSPITAL COURSE
Twan Camacho is a 54 y.o. male with PMH of well-controlled Type 2 DM presenting as a transfer from [place] ED for concern of right toe osteomyelitis.     He presented to [place] ED 11/9 for worsening right great toe and foot swelling over the last two days. He has had two episodes of cellulitis of right foot and toe in April 2024 and August 2024 and had been previously prescribed 10 day course of bactrim. Two days ago, he noticed increasing swelling and redness of right great toe and started taking bactrim (x 2 days), however the redness and swelling continued to spread to lower ankles which prompted him to go to ED. Upon arrival, vitals were stable. CBC and CMP were unremarkable. CRP 8.89. ESR and Lactate in normal range. Blood cultures were obtained. X-ray of right foot with destruction and fragmentation of distal 1st digit phalanx. X-ray of right tibia fibula showed a calcaneal enthesophyte and heterotopic ossification in calf, but no osseous destruction or fracture. He was started on IV zosyn and vancomycin. Dr. Gillespie was made aware of patient. He was transferred to [facility] and admitted to floors for possible surgical intervention.     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 [facility] in [place] for the past year, but hasn't seen them in 2 months as he had no issues. His right great toe and foot gets edematous occasionally, but it usually subsides with 1-2 days of ice and elevation. He was diagnosed with diabetes in 2018, and it has been well-controlled on ozempic. A1C was 5.8 in August 2024.     On the floor, he has continued to be treated with IV Vancomysin and Zosyn. Patient underwent right hallux amputation with removal of 1st metatarsal head and sesamoids on 11/10. Remains HDS. Awaiting final cx for narrowing of abx.

## 2024-11-11 NOTE — PROGRESS NOTES
Physical Therapy                 Therapy Communication Note - Cancel PT Eval    Patient Name: Twan Camacho  MRN: [id number]  Department: [address]  Room: [address] [place], [place] [address]  Today's Date: 11/11/2024     Discipline: Physical Therapy    Missed Visit Reason: Missed Visit Reason: Cancel (Per conversation with OT, pt. is functioning at modified IND level with FWW and post-op shoe for all basic mobility tasks.  He is ambulating in room and hallway safely.  No acute PT needs.  No follow-up PT needs.)    Missed Time: Cancel    Comment:

## 2024-11-11 NOTE — PROGRESS NOTES
"Subjective   Subjective:   History Of Present Illness:  Twan Camacho is a 54 y.o. male was seen and evaluated at bedside today. Overnight, no reported acute events. Patient is at no acute distress. Patient is feeling well this morning. He states that his pain has been very well controlled with Tylenol. Denies CP, SOB, abd pain, fevers, chills.     Review Of Systems:  11-point ROS was performed and is negative except as noted below and in the HPI.        Objective   Objective:     /74 (BP Location: Left arm, Patient Position: Lying)   Pulse 85   Temp 36.1 °C (97 °F) (Temporal)   Resp 16   Ht 1.676 m (5' 5.98\")   Wt 126 kg (277 lb 12.5 oz)   SpO2 94%   BMI 44.86 kg/m²     Physical Exam  Constitutional:       General: He is awake.   Eyes:      Extraocular Movements: Extraocular movements intact.   Cardiovascular:      Rate and Rhythm: Normal rate and regular rhythm.   Pulmonary:      Effort: Pulmonary effort is normal. No respiratory distress.      Breath sounds: Normal breath sounds.   Abdominal:      General: There is no distension.      Palpations: Abdomen is soft.      Tenderness: There is no abdominal tenderness. There is no guarding or rebound.   Musculoskeletal:      Comments: Dressings over the right foot   Skin:     General: Skin is warm.   Neurological:      General: No focal deficit present.      Mental Status: He is alert.   Psychiatric:         Mood and Affect: Mood normal.         Behavior: Behavior normal. Behavior is cooperative.       Lab Work:     Lab Results   Component Value Date    WBC 9.7 11/11/2024    HGB 13.7 11/11/2024    HCT 41.0 11/11/2024    MCV 93 11/11/2024     11/11/2024     Lab Results   Component Value Date    GLUCOSE 95 11/11/2024    CALCIUM 8.2 (L) 11/11/2024     (L) 11/11/2024    K 4.0 11/11/2024    CO2 23 11/11/2024     11/11/2024    BUN 11 11/11/2024    CREATININE 0.78 11/11/2024     Hemoglobin A1C   Date Value Ref Range Status   10/31/2018 6.4 % " Final     Comment:          Diagnosis of Diabetes-Adults   Non-Diabetic: < or = 5.6%   Increased risk for developing diabetes: 5.7-6.4%   Diagnostic of diabetes: > or = 6.5%  .       Monitoring of Diabetes                Age (y)     Therapeutic Goal (%)   Adults:          >18           <7.0   Pediatrics:    13-18           <7.5                   7-12           <8.0                   0- 6            7.5-8.5   American Diabetes Association. Diabetes Care 33(S1), Jan 2010.       Cultures:   No results found for the last 90 days.    Images:     MR foot right w and wo IV contrast   Final Result   1. Amputation of the 1st ray at the level of the head of the 1st   metatarsal along with its sesamoid bones. There is some signal   changes in the distal 1st metatarsal at the amputation margin most   compatible with reactive changes related to the surgery although   subtle osteomyelitis is not absolutely excluded.   2. Small volume of fluid and likely gas in the amputation bed which   is felt to be secondary to the surgery without you rim enhancement   evident.   3. Subluxation dorsally at the 2nd and 3rd digit metatarsophalangeal   joints,, greater at the 2nd digit metatarsophalangeal joint.   4. Findings likely representing a combination of cellulitis and   lymphedema of the visualized lower extremity.   5. Findings most compatible with ischemic/diabetic myopathy.        Signed by: Johnathon Perry 11/10/2024 2:26 PM   Dictation workstation:   [id number]         Medications:     Scheduled:  enoxaparin, 40 mg, subcutaneous, q12h VANITA  insulin lispro, 0-10 Units, subcutaneous, Before meals & nightly  piperacillin-tazobactam, 3.375 g, intravenous, q6h  vancomycin, 2,000 mg, intravenous, q12h    Continuous:     PRN:  PRN medications: acetaminophen, oxyCODONE, oxyCODONE, polyethylene glycol, vancomycin     Assessment & Plan:   Twan Camacho is a 54 y.o. male with PMH of Type 2 DM, HTN, HLD, and presenting as a transfer from  [place] ED for concern of right toe osteomyelitis.     Acute Medical Issues:  #Right toe and foot cellulitis c/b right toe osteomyelitis  #S/P Right hallux amputation with removal of 1st metatarsal head and sesamoids. POD 1  #Likely 2/2 neuropathy of b/l lower extremities - unknown etiology  - Hemodynamically stable  - CRP 8.89  - Blood cx 11/9 no growth 1 day  - Tissue cx gram + bacilli  - Xray of right foot with destruction and fragmentation of distal 1st digit phalanx  - MRI pending.  - Podiatry following, right hallux amputation with removal of 1st metatarsal head and sesamoids on 11/10, recommended continue IV antibiotic as per ID  - Wound care consulted  PLAN:  - Continue IV Vanc and zosyn  - F/up blood cx, tissue cx  - ID following. Recommended vascular evaluation, keep the leg elevated, wound care.   - Pain management Tylenol 650 every 6 hrs for mild, oxycodone 5 mg every 6 hrs prn for moderate and oxycodone 10 mg every 6 as needed for severe pain.   - PT/OT ordered    Chronic Medical Issues:  #HTN - not on any meds  #DM - well-controlled, A1C 5.8 in August 2024. Holding home ozempic. On SSI.     F: None  E: Replete as needed  N: Regular diet  Abx: vanc and zosyn  GI ppx: None  DVT ppx: Lovenox  Tubes/Lines/Drains: None     Code status: Full Code    Dispo: 54 y.o. male admitted for right toe osteomyelitis. S/P Right hallux amputation with removal of 1st metatarsal head and sesamoids 11/10. POD 1. Anticipated LOS dependent on PT/OT raya.     Paula Jones DO    Disclaimer: Documentation completed with the information available at the time of input. The times in the chart may not be reflective of actual patient care times, interventions, or procedures. Documentation occurs after the physical care of the patient.

## 2024-11-11 NOTE — NURSING NOTE
Patient admitted with osteo right 1st toe - Dr. Santana performed amputation and is managing the postop wound.  DAKOTA Schwab reports skin is otherwise intact.  Will plan to follow along in the sidelines. Please message as needed.

## 2024-11-11 NOTE — PROGRESS NOTES
Pharmacy Medication History Review    Twan Camacho is a 54 y.o. male admitted for Osteomyelitis. Pharmacy reviewed the patient's [id number] medications and allergies for accuracy.    The list below reflectives the updated PTA list. Please review each medication in order reconciliation for additional clarification and justification.    Prior to Admission Medications   Prescriptions Last Dose Informant Patient Reported? Taking?   Ozempic 1 mg/dose (4 mg/3 mL) pen injector  Self Yes Yes   Sig: Inject 1 mg under the skin 1 (one) time per week.      Facility-Administered Medications: None        The list below reflectives the updated allergy list. Please review each documented allergy for additional clarification and justification.  Allergies  Reviewed by Megan Shankar DO on 11/9/2024   No Known Allergies         Umu Ang

## 2024-11-12 ENCOUNTER — HOME HEALTH ADMISSION (OUTPATIENT)
Dept: HOME HEALTH SERVICES | Facility: HOME HEALTH | Age: 54
End: 2024-11-12
Payer: COMMERCIAL

## 2024-11-12 VITALS
DIASTOLIC BLOOD PRESSURE: 77 MMHG | HEIGHT: 66 IN | WEIGHT: 277.78 LBS | HEART RATE: 54 BPM | SYSTOLIC BLOOD PRESSURE: 128 MMHG | BODY MASS INDEX: 44.64 KG/M2 | TEMPERATURE: 97.9 F | RESPIRATION RATE: 19 BRPM | OXYGEN SATURATION: 95 %

## 2024-11-12 LAB
ALBUMIN SERPL BCP-MCNC: 3.2 G/DL (ref 3.4–5)
ANION GAP SERPL CALC-SCNC: 12 MMOL/L (ref 10–20)
BUN SERPL-MCNC: 14 MG/DL (ref 6–23)
CALCIUM SERPL-MCNC: 8.8 MG/DL (ref 8.6–10.3)
CHLORIDE SERPL-SCNC: 104 MMOL/L (ref 98–107)
CO2 SERPL-SCNC: 25 MMOL/L (ref 21–32)
CREAT SERPL-MCNC: 1.02 MG/DL (ref 0.5–1.3)
EGFRCR SERPLBLD CKD-EPI 2021: 88 ML/MIN/1.73M*2
ERYTHROCYTE [DISTWIDTH] IN BLOOD BY AUTOMATED COUNT: 11.8 % (ref 11.5–14.5)
GLUCOSE BLD MANUAL STRIP-MCNC: 100 MG/DL (ref 74–99)
GLUCOSE BLD MANUAL STRIP-MCNC: 174 MG/DL (ref 74–99)
GLUCOSE SERPL-MCNC: 88 MG/DL (ref 74–99)
HCT VFR BLD AUTO: 40.2 % (ref 41–52)
HGB BLD-MCNC: 13.8 G/DL (ref 13.5–17.5)
MAGNESIUM SERPL-MCNC: 2.09 MG/DL (ref 1.6–2.4)
MCH RBC QN AUTO: 31.4 PG (ref 26–34)
MCHC RBC AUTO-ENTMCNC: 34.3 G/DL (ref 32–36)
MCV RBC AUTO: 91 FL (ref 80–100)
NRBC BLD-RTO: 0 /100 WBCS (ref 0–0)
PHOSPHATE SERPL-MCNC: 4 MG/DL (ref 2.5–4.9)
PLATELET # BLD AUTO: 270 X10*3/UL (ref 150–450)
POTASSIUM SERPL-SCNC: 3.9 MMOL/L (ref 3.5–5.3)
RBC # BLD AUTO: 4.4 X10*6/UL (ref 4.5–5.9)
SODIUM SERPL-SCNC: 137 MMOL/L (ref 136–145)
VANCOMYCIN SERPL-MCNC: 28.5 UG/ML (ref 5–20)
WBC # BLD AUTO: 9.1 X10*3/UL (ref 4.4–11.3)

## 2024-11-12 PROCEDURE — 2500000004 HC RX 250 GENERAL PHARMACY W/ HCPCS (ALT 636 FOR OP/ED): Performed by: INTERNAL MEDICINE

## 2024-11-12 PROCEDURE — 85027 COMPLETE CBC AUTOMATED: CPT | Performed by: PODIATRIST

## 2024-11-12 PROCEDURE — 82947 ASSAY GLUCOSE BLOOD QUANT: CPT

## 2024-11-12 PROCEDURE — 2500000005 HC RX 250 GENERAL PHARMACY W/O HCPCS: Performed by: INTERNAL MEDICINE

## 2024-11-12 PROCEDURE — 99238 HOSP IP/OBS DSCHRG MGMT 30/<: CPT | Performed by: INTERNAL MEDICINE

## 2024-11-12 PROCEDURE — 83735 ASSAY OF MAGNESIUM: CPT | Performed by: PODIATRIST

## 2024-11-12 PROCEDURE — 36415 COLL VENOUS BLD VENIPUNCTURE: CPT | Performed by: PODIATRIST

## 2024-11-12 PROCEDURE — 80202 ASSAY OF VANCOMYCIN: CPT | Performed by: PODIATRIST

## 2024-11-12 PROCEDURE — 84100 ASSAY OF PHOSPHORUS: CPT | Performed by: PODIATRIST

## 2024-11-12 PROCEDURE — 2500000004 HC RX 250 GENERAL PHARMACY W/ HCPCS (ALT 636 FOR OP/ED): Performed by: PODIATRIST

## 2024-11-12 PROCEDURE — 2500000004 HC RX 250 GENERAL PHARMACY W/ HCPCS (ALT 636 FOR OP/ED)

## 2024-11-12 RX ORDER — VANCOMYCIN HYDROCHLORIDE 1 G/200ML
1000 INJECTION, SOLUTION INTRAVENOUS EVERY 8 HOURS
Status: DISCONTINUED | OUTPATIENT
Start: 2024-11-12 | End: 2024-11-12 | Stop reason: HOSPADM

## 2024-11-12 RX ORDER — AMOXICILLIN AND CLAVULANATE POTASSIUM 875; 125 MG/1; MG/1
875 TABLET, FILM COATED ORAL 2 TIMES DAILY
Qty: 20 TABLET | Refills: 0 | Status: SHIPPED | OUTPATIENT
Start: 2024-11-12 | End: 2024-11-22

## 2024-11-12 RX ADMIN — PIPERACILLIN SODIUM AND TAZOBACTAM SODIUM 3.38 G: 3; .375 INJECTION, SOLUTION INTRAVENOUS at 03:54

## 2024-11-12 RX ADMIN — ENOXAPARIN SODIUM 40 MG: 100 INJECTION SUBCUTANEOUS at 01:58

## 2024-11-12 RX ADMIN — ENOXAPARIN SODIUM 40 MG: 100 INJECTION SUBCUTANEOUS at 15:08

## 2024-11-12 RX ADMIN — VANCOMYCIN HYDROCHLORIDE 1000 MG: 1 INJECTION, SOLUTION INTRAVENOUS at 10:14

## 2024-11-12 RX ADMIN — PIPERACILLIN SODIUM AND TAZOBACTAM SODIUM 3.38 G: 3; .375 INJECTION, SOLUTION INTRAVENOUS at 15:09

## 2024-11-12 RX ADMIN — VANCOMYCIN HYDROCHLORIDE 1500 MG: 5 INJECTION, POWDER, LYOPHILIZED, FOR SOLUTION INTRAVENOUS at 01:56

## 2024-11-12 RX ADMIN — PIPERACILLIN SODIUM AND TAZOBACTAM SODIUM 3.38 G: 3; .375 INJECTION, SOLUTION INTRAVENOUS at 09:04

## 2024-11-12 ASSESSMENT — COGNITIVE AND FUNCTIONAL STATUS - GENERAL
MOBILITY SCORE: 24
DAILY ACTIVITIY SCORE: 24

## 2024-11-12 ASSESSMENT — PAIN SCALES - GENERAL: PAINLEVEL_OUTOF10: 0 - NO PAIN

## 2024-11-12 NOTE — CARE PLAN
The patient's goals for the shift include  patient will remain comfortable throughout shift.     The clinical goals for the shift include patient's pain will be managed throughout shift.

## 2024-11-12 NOTE — PROGRESS NOTES
"Subjective   Subjective:   History Of Present Illness:  Twan Camacho is a 54 y.o. male was seen and evaluated at bedside today. Overnight, no reported acute events. Patient is at no acute distress. Patient is feeling well this morning. He states that his pain has been very well controlled with Tylenol. Denies CP, SOB, abd pain, fevers, chills.     Review Of Systems:  11-point ROS was performed and is negative except as noted below and in the HPI.        Objective   Objective:     /72 (BP Location: Left arm, Patient Position: Lying)   Pulse 82   Temp 36.7 °C (98.1 °F) (Temporal)   Resp 20   Ht 1.676 m (5' 5.98\")   Wt 126 kg (277 lb 12.5 oz)   SpO2 95%   BMI 44.86 kg/m²     Physical Exam  Constitutional:       General: He is awake.   Eyes:      Extraocular Movements: Extraocular movements intact.   Cardiovascular:      Rate and Rhythm: Normal rate and regular rhythm.   Pulmonary:      Effort: Pulmonary effort is normal. No respiratory distress.      Breath sounds: Normal breath sounds.   Abdominal:      General: There is no distension.      Palpations: Abdomen is soft.      Tenderness: There is no abdominal tenderness. There is no guarding or rebound.   Musculoskeletal:      Comments: Dressings over the right foot   Skin:     General: Skin is warm.   Neurological:      General: No focal deficit present.      Mental Status: He is alert.   Psychiatric:         Mood and Affect: Mood normal.         Behavior: Behavior normal. Behavior is cooperative.       Lab Work:     Lab Results   Component Value Date    WBC 9.1 11/12/2024    HGB 13.8 11/12/2024    HCT 40.2 (L) 11/12/2024    MCV 91 11/12/2024     11/12/2024     Lab Results   Component Value Date    GLUCOSE 88 11/12/2024    CALCIUM 8.8 11/12/2024     11/12/2024    K 3.9 11/12/2024    CO2 25 11/12/2024     11/12/2024    BUN 14 11/12/2024    CREATININE 1.02 11/12/2024     Hemoglobin A1C   Date Value Ref Range Status   10/31/2018 6.4 % " Final     Comment:          Diagnosis of Diabetes-Adults   Non-Diabetic: < or = 5.6%   Increased risk for developing diabetes: 5.7-6.4%   Diagnostic of diabetes: > or = 6.5%  .       Monitoring of Diabetes                Age (y)     Therapeutic Goal (%)   Adults:          >18           <7.0   Pediatrics:    13-18           <7.5                   7-12           <8.0                   0- 6            7.5-8.5   American Diabetes Association. Diabetes Care 33(S1), Jan 2010.       Cultures:   No results found for the last 90 days.    Images:     MR foot right w and wo IV contrast   Final Result   1. Amputation of the 1st ray at the level of the head of the 1st   metatarsal along with its sesamoid bones. There is some signal   changes in the distal 1st metatarsal at the amputation margin most   compatible with reactive changes related to the surgery although   subtle osteomyelitis is not absolutely excluded.   2. Small volume of fluid and likely gas in the amputation bed which   is felt to be secondary to the surgery without you rim enhancement   evident.   3. Subluxation dorsally at the 2nd and 3rd digit metatarsophalangeal   joints,, greater at the 2nd digit metatarsophalangeal joint.   4. Findings likely representing a combination of cellulitis and   lymphedema of the visualized lower extremity.   5. Findings most compatible with ischemic/diabetic myopathy.        Signed by: Johnathon Perry 11/10/2024 2:26 PM   Dictation workstation:   [id number]         Medications:     Scheduled:  enoxaparin, 40 mg, subcutaneous, q12h VANITA  insulin lispro, 0-10 Units, subcutaneous, Before meals & nightly  piperacillin-tazobactam, 3.375 g, intravenous, q6h  vancomycin, 1,000 mg, intravenous, q8h    Continuous:     PRN:  PRN medications: acetaminophen, oxyCODONE, oxyCODONE, oxygen, polyethylene glycol, vancomycin     Assessment & Plan:   Twan Camacho is a 54 y.o. male with PMH of Type 2 DM, HTN, HLD, and presenting as a transfer  from [place] ED for concern of right toe osteomyelitis. Amputation of right hallux on 11/10.     Acute Medical Issues:  #Right toe and foot cellulitis c/b right toe osteomyelitis  #S/P Right hallux amputation with removal of 1st metatarsal head and sesamoids. POD 1  #Likely 2/2 neuropathy of b/l lower extremities - unknown etiology  - Hemodynamically stable  - CRP 8.89  - Blood cx 11/9 no growth 2 day  - Tissue cx gram + bacilli  - Podiatry following, right hallux amputation with removal of 1st metatarsal head and sesamoids on 11/10, recommended continue IV antibiotic as per ID  - Wound care following  PLAN:  - Continue IV Vanc and zosyn  - F/up blood cx, tissue cx  - ID following. Recommended vascular evaluation, keep the leg elevated, wound care.   - Pain management Tylenol 650 every 6 hrs for mild, oxycodone 5 mg every 6 hrs prn for moderate and oxycodone 10 mg every 6 as needed for severe pain.   - PT/OT assessment: not needed for discharge  - Awaiting final cx to narrow abx    Chronic Medical Issues:  #HTN - not on any meds  #DM - well-controlled, A1C 5.8 in August 2024. Holding home ozempic.     F: None  E: Replete as needed  N: Regular diet  Abx: vanc and zosyn  GI ppx: None  DVT ppx: Lovenox  Tubes/Lines/Drains: None     Code status: Full Code    Dispo: 54 y.o. male admitted for right toe osteomyelitis. S/P Right hallux amputation with removal of 1st metatarsal head and sesamoids 11/10. POD 2. Awaiting final cx for abx for discharge. Will discharge home with no PT/OT.     Paula Jones DO    Disclaimer: Documentation completed with the information available at the time of input. The times in the chart may not be reflective of actual patient care times, interventions, or procedures. Documentation occurs after the physical care of the patient.

## 2024-11-12 NOTE — PROGRESS NOTES
Vancomycin Dosing by Pharmacy- FOLLOW UP    Twan Camacho is a 54 y.o. year old male who Pharmacy has been consulted for vancomycin dosing for osteomyelitis/septic arthritis. Based on the patient's indication and renal status this patient is being dosed based on a goal AUC of 400-600.     Renal function is currently stable.    Current vancomycin dose: 1500 mg given every 8 hours    Estimated vancomycin AUC on current dose: 734 mg/L.hr     Visit Vitals  /72 (BP Location: Left arm, Patient Position: Lying)   Pulse 82   Temp 36.7 °C (98.1 °F) (Temporal)   Resp 20        Lab Results   Component Value Date    CREATININE 1.02 2024    CREATININE 0.78 2024    CREATININE 0.89 11/10/2024    CREATININE 1.03 2024        Patient weight is as follows:   Vitals:    24 1518   Weight: 126 kg (277 lb 12.5 oz)       Cultures:  No results found for the encounter in last 14 days.       I/O last 3 completed shifts:  In: 3156 (25 mL/kg) [P.O.:956; IV Piggyback:2200]  Out: - (0 mL/kg)   Weight: 126 kg   I/O during current shift:  No intake/output data recorded.    Temp (24hrs), Av.7 °C (98 °F), Min:36.6 °C (97.9 °F), Max:36.7 °C (98.1 °F)      Assessment/Plan    Above goal AUC. Orders placed for new vancomcyin regimen of 1000 every 8 hours to begin at 11/ 1000.    This dosing regimen is predicted by [employer] to result in the following pharmacokinetic parameters:  Loading dose: N/A  Regimen: 1000 mg IV every 8 hours.  Start time: 09:56 on 2024  Exposure target: AUC24 (range)400-600 mg/L.hr   [id number]: 489 mg/L.hr  AUC24,ss: 489 mg/L.hr  Probability of AUC24 > 400: 100 %  Ctrough,ss: 7.6 mg/L  Probability of Ctrough,ss > 20: 0 %    The next level will be obtained on  at am labs. May be obtained sooner if clinically indicated.   Will continue to monitor renal function daily while on vancomycin and order serum creatinine at least every 48 hours if not already ordered.  Follow for continued  vancomycin needs, clinical response, and signs/symptoms of toxicity.       Joe Reyna, PharmD  PGY1 Pharmacy Resident

## 2024-11-12 NOTE — PROGRESS NOTES
Twan Camacho is a 54 y.o. male on day 3 of admission presenting with Osteomyelitis.    Subjective   Interval History: no fever, no new complaints  wants to go home       Review of Systems    Objective   Range of Vitals (last 24 hours)  Heart Rate:  [67-91]   Temp:  [36.6 °C (97.9 °F)-36.7 °C (98.1 °F)]   Resp:  [16-20]   BP: (117-130)/(61-81)   SpO2:  [93 %-96 %]   Daily Weight  11/09/24 : 126 kg (277 lb 12.5 oz)    Body mass index is 44.86 kg/m².    Physical Exam  Constitutional:       Appearance: Normal appearance.   HENT:      Head: Normocephalic and atraumatic.      Mouth/Throat:      Mouth: Mucous membranes are moist.      Pharynx: Oropharynx is clear.   Eyes:      Pupils: Pupils are equal, round, and reactive to light.   Cardiovascular:      Rate and Rhythm: Normal rate and regular rhythm.      Heart sounds: Normal heart sounds.   Pulmonary:      Effort: Pulmonary effort is normal.      Breath sounds: Normal breath sounds.   Abdominal:      General: Abdomen is flat. Bowel sounds are normal.      Palpations: Abdomen is soft.   Musculoskeletal:      Cervical back: Normal range of motion.      Comments: Rt foot dressing   Neurological:      Mental Status: He is alert.         Antibiotics  piperacillin-tazobactam - 3.375 gram/50 mL  vancomycin - 1 gram/200 mL    Relevant Results  Labs  Results from last 72 hours   Lab Units 11/12/24  0615 11/11/24  0610 11/10/24  0532   WBC AUTO x10*3/uL 9.1 9.7 9.9   HEMOGLOBIN g/dL 13.8 13.7 14.5   HEMATOCRIT % 40.2* 41.0 44.2   PLATELETS AUTO x10*3/uL 270 250 258     Results from last 72 hours   Lab Units 11/12/24  0615 11/11/24  0610 11/10/24  0532   SODIUM mmol/L 137 135* 134*   POTASSIUM mmol/L 3.9 4.0 3.7   CHLORIDE mmol/L 104 104 100   CO2 mmol/L 25 23 24   BUN mg/dL 14 11 10   CREATININE mg/dL 1.02 0.78 0.89   GLUCOSE mg/dL 88 95 107*   CALCIUM mg/dL 8.8 8.2* 8.7   ANION GAP mmol/L 12 12 14   EGFR mL/min/1.73m*2 88 >90 >90   PHOSPHORUS mg/dL 4.0 4.1 3.8     Results from  last 72 hours   Lab Units 11/12/24  0615 11/11/24  0610 11/10/24  0532   ALBUMIN g/dL 3.2* 3.2* 3.4     Estimated Creatinine Clearance: 106.3 mL/min (by C-G formula based on SCr of 1.02 mg/dL).  C-Reactive Protein   Date Value Ref Range Status   11/09/2024 8.89 (H) <1.00 mg/dL Final     Microbiology  Reviewed  Imaging  Reviewed        Assessment/Plan      Right 1st toe infection / osteomyelitis / Rt foot cellulitis, sp toe amputation, the culture is pending     Recommendations :  Plan on 1 week of oral Augmentin if discharged before the culture results  Discussed with the surgery team    I spent minutes in the professional and overall care of this patient.      Norbert Coughlin MD

## 2024-11-12 NOTE — PROGRESS NOTES
11/12/24 1618   Discharge Planning   Expected Discharge Disposition Home H  (new [facility] for wound care)     11/12/2024 1618: Made aware pt will be staying at his father's house, address is [address] [place] [address] [place] [address]. Provider placed internal referral.

## 2024-11-12 NOTE — PROGRESS NOTES
11/12/24 1100   Advance Directives (For Healthcare)   Have you reviewed your Advance Directive and is it valid for this stay? Yes     SW met with pt & spouse at bedside; pt is sleeping.  Spouse requested pt be allowed to sleep; SW provided information on how to complete paperwork in case pt leaves prior to SW return.  Spouse stated understanding.

## 2024-11-12 NOTE — DISCHARGE SUMMARY
Discharge Diagnosis  Osteomyelitis    Issues Requiring Follow-Up  F/up with PCP for hospital discharge  Follow-up in wound care center at [place] next week     Discharge Meds     Medication List      START taking these medications     amoxicillin-pot clavulanate 875-125 mg tablet; Commonly known as:   Augmentin; Take 1 tablet (875 mg) by mouth 2 times a day for 10 days.     CONTINUE taking these medications     Ozempic 1 mg/dose (4 mg/3 mL) pen injector; Generic drug: semaglutide       Test Results Pending At Discharge  Pending Labs       Order Current Status    Surgical Pathology Exam In process    Tissue/Wound Culture/Smear Preliminary result            Hospital Course  Twan Camacho is a 54 y.o. male with PMH of well-controlled Type 2 DM presenting as a transfer from [place] ED for concern of right toe osteomyelitis.     He presented to [facility] ED 11/9 for worsening right great toe and foot swelling over the last two days. He has had two episodes of cellulitis of right foot and toe in April 2024 and August 2024 and had been previously prescribed 10 day course of bactrim. Two days ago, he noticed increasing swelling and redness of right great toe and started taking bactrim (x 2 days), however the redness and swelling continued to spread to lower ankles which prompted him to go to ED. Upon arrival, vitals were stable. CBC and CMP were unremarkable. CRP 8.89. ESR and Lactate in normal range. Blood cultures were obtained. X-ray of right foot with destruction and fragmentation of distal 1st digit phalanx. X-ray of right tibia fibula showed a calcaneal enthesophyte and heterotopic ossification in calf, but no osseous destruction or fracture. He was started on IV zosyn and vancomycin. Dr. Gillespie was made aware of patient. He was transferred to [facility] and admitted to floors for possible surgical intervention.     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 He follows  with [facility] pod[facility] in [place] for the past year, but hasn't seen them in 2 months as he had no issues. His right great toe and foot gets edematous occasionally, but it usually subsides with 1-2 days of ice and elevation. He was diagnosed with diabetes in 2018, and it has been well-controlled on ozempic. A1C was 5.8 in August 2024.     On the floor, he has continued to be treated with IV Vancomysin and Zosyn. Patient underwent right hallux amputation with removal of 1st metatarsal head and sesamoids on 11/10. Remains HDS. Awaiting final cx for narrowing of abx.      Pertinent Physical Exam At Time of Discharge  Physical Exam  Constitutional:       General: He is awake.   Eyes:      Extraocular Movements: Extraocular movements intact.   Cardiovascular:      Rate and Rhythm: Normal rate and regular rhythm.   Pulmonary:      Effort: Pulmonary effort is normal. No respiratory distress.      Breath sounds: Normal breath sounds.   Abdominal:      General: There is no distension.      Palpations: Abdomen is soft.      Tenderness: There is no abdominal tenderness. There is no guarding or rebound.   Musculoskeletal:      Comments: Dressings over the right foot   Skin:     General: Skin is warm.   Neurological:      General: No focal deficit present.      Mental Status: He is alert.   Psychiatric:         Mood and Affect: Mood normal.         Behavior: Behavior normal. Behavior is cooperative.         Outpatient Follow-Up  No future appointments.      Paula Jones DO

## 2024-11-12 NOTE — OP NOTE
Amputation Digit Foot (R) Operative Note     Date: 11/10/2024  OR Location: [facility] OR    Name: Twan Camacho, : 1970, Age: 54 y.o., MRN: [id number], Sex: male    Diagnosis  Pre-op Diagnosis      * Osteomyelitis [M86.9] Post-op Diagnosis     * Osteomyelitis [M86.9]     Procedures  Amputation Digit Foot  09652 - MD AMPUTATION TOE METATARSOPHALANGEAL JOINT    Right hallux amputation, right first metatarsal head resection and sesamoid removal    Surgeons      * Windy Santana - Primary    Resident/Fellow/Other Assistant:  Surgeons and Role:  * No surgeons found with a matching role *    Staff:   Circulator: Liana Hassan Person: Venkatesh    Anesthesia Staff: Anesthesiologist: Connie Tang DO  CRNA: JUDY Bell-CRNA    Procedure Summary  Anesthesia: Monitor Anesthesia Care  ASA: III  Estimated Blood Loss: 5 mL  Intra-op Medications:   Administrations occurring from 1048 to 1151 on 11/10/24:   Medication Name Total Dose   bupivacaine PF 0.5 % (Marcaine) 0.5 % (5 mg/mL) injection 10 mL   acetaminophen (Tylenol) tablet 650 mg Cannot be calculated   oxyCODONE (Roxicodone) immediate release tablet 10 mg Cannot be calculated   oxyCODONE (Roxicodone) immediate release tablet 5 mg Cannot be calculated   piperacillin-tazobactam (Zosyn) 3.375 g in dextrose (iso) IV 50 mL Cannot be calculated   polyethylene glycol (Glycolax, Miralax) packet 17 g Cannot be calculated   vancomycin (Vancocin) pharmacy to dose - pharmacy monitoring Cannot be calculated   insulin lispro injection 0-10 Units Cannot be calculated   propofol (Diprivan) injection 10 mg/mL Cannot be calculated   NaCl 0.9 % bolus Cannot be calculated   vancomycin (Vancocin) 2000 mg/500 ml in D5W 500 mL IV piggyback 2,000 mg Cannot be calculated              Anesthesia Record               Intraprocedure I/O Totals          Intake    Ketamine 4.00 mL    The total shown is the total volume documented since Anesthesia Start was filed.    NaCl 0.9 %  bolus 200.00 mL    vancomycin (Vancocin) 2,000 mg in sodium chloride 0.9% 250 mL .00 mL    Total Intake 704 mL          Specimen:   ID Type Source Tests Collected by Time   1 : RIGHT GREAT TOE Tissue DIGIT FIRST, RIGHT FOOT SURGICAL PATHOLOGY EXAM Windy Santana DPM 11/10/2024 1048   A : RIGHT HALLUX AMPUTATION SITE Swab FOOT AMPUTATION RIGHT TISSUE/WOUND CULTURE/SMEAR Windy Santana DPM 11/10/2024 1034                 Drains and/or Catheters: * None in log *    Tourniquet Times:     Total Tourniquet Time Documented:  area (laterality) - 29 minutes  Total: area (laterality) - 29 minutes      Implants:     Findings: as dictated    Indications: Twan Camacho is an 54 y.o. male who is having surgery for Osteomyelitis [M86.9].      The patient was seen in the preoperative area. The risks, benefits, complications, treatment options, non-operative alternatives, expected recovery and outcomes were discussed with the patient. The possibilities of reaction to medication, pulmonary aspiration, injury to surrounding structures, bleeding, recurrent infection, the need for additional procedures, failure to diagnose a condition, and creating a complication requiring transfusion or operation were discussed with the patient. The patient concurred with the proposed plan, giving informed consent.  The site of surgery was properly noted/marked if necessary per policy. The patient has been actively warmed in preoperative area. Preoperative antibiotics have been ordered and given within 1 hours of incision. Venous thrombosis prophylaxis have been ordered including chemical prophylaxis    Procedure Details:    Patient brought to operating room and remained on hospital bed for surgical procedure.  Huddle and Time out performed.  After induction of IV sedation, 10 ml's of 1:1 mixture of 1% lidocaine plain and 0.5% marcaine plain injected around right 1st MPJ in metatarsal block fashion.  Right foot and lower leg  scrubbed, prepped and draped with betasept. The Right foot and lower leg was then exanguinated with an esmarc bandage and the tourniquet was inflated to 250 mmHg.    Attention was then directed to right hallux  where a fish mouth incision was made over the toe base;  The right hallux was then disarticulated at the MPJ and passed from operative field.   A deep C&S was then taken.  Purulence expressed.  The right 1st metatarsal head cartilage was intact and glistening white. No proximal purulence.  Adequate perfusion to site.  Any bleeding controlled with bovie electrocautery.  Irrigated with copious amounts of saline.  Removed first metatarsal head  with small power sagittal saw and sesamoids . rrigated with copious amounts of saline.   Closed subcutaneous tissue with 3-0 vicryl, skin closed with 3-0 prolene.  Dressed with adaptic, 4x4's, kerlix and light ace bandage.    Patient tolerated procedure and anesthesia well.  Transferred to PACU with VSS and VSI to all remaining toes right foot.  After a brief period of post-operative monitoring, the patient will be transferred back to floor for further medical management.    Complications:  None; patient tolerated the procedure well.    Disposition: PACU - hemodynamically stable.  Condition: stable                 Additional Details: none    Attending Attestation: I performed the procedure.    Windy Santana  Phone Number: [phone]

## 2024-11-12 NOTE — DISCHARGE INSTRUCTIONS
Right foot wound care -     Keep dressing clean, dry and intact.  Limit WB activities in post-op shoe.  Continue to elevate right foot.  Follow-up in wound care center next Wednesday.

## 2024-11-12 NOTE — PROGRESS NOTES
"Twan Camacho is a 54 y.o. male on day 3 of admission presenting with Osteomyelitis.    Subjective   POD#2 s/p right hallux and first metatarsal head resection.  NO pain.  Denies any systemic symptoms.    Meds:  Scheduled medications  enoxaparin, 40 mg, subcutaneous, q12h VANITA  insulin lispro, 0-10 Units, subcutaneous, Before meals & nightly  piperacillin-tazobactam, 3.375 g, intravenous, q6h  vancomycin, 1,000 mg, intravenous, q8h      Continuous medications     PRN medications  PRN medications: acetaminophen, oxyCODONE, oxyCODONE, oxygen, polyethylene glycol, vancomycin       Physical Exam   Constitutional:       General: He is not in acute distress.  Cardiovascular:      Rate and Rhythm: Normal rate and regular rhythm.      Heart sounds: No murmur heard.  Pulmonary:      Effort: Pulmonary effort is normal. No respiratory distress.      Breath sounds: Normal breath sounds.   Abdominal:      General: Abdomen is flat. Bowel sounds are normal. There is no distension.      Tenderness: There is no abdominal tenderness.   Musculoskeletal:      Right lower leg: Edema present.      Left lower leg: No edema.      Comments: erythema and edema to right forefoot continues to decrease;  surgically absent right hallux with well coapted surgical incision with sutures intact;  no active drainage;   Skin:     General: Skin is warm and dry.   Neurological:      General: No focal deficit present.      Mental Status: He is alert and oriented to person, place, and time.     Last Recorded Vitals  Blood pressure 130/72, pulse 82, temperature 36.7 °C (98.1 °F), temperature source Temporal, resp. rate 20, height 1.676 m (5' 5.98\"), weight 126 kg (277 lb 12.5 oz), SpO2 95%.    Intake/Output last 3 Shifts:  I/O last 3 completed shifts:  In: 3156 (25 mL/kg) [P.O.:956; IV Piggyback:2200]  Out: - (0 mL/kg)   Weight: 126 kg     Relevant Results   MR foot right w and wo IV contrast    Result Date: 11/10/2024  Interpreted By:  Johnathon Perry, " STUDY: MRI of the    right forefoot without and with contrast dated 11/10/2024.   INDICATION: Signs/Symptoms:C/f osteomyelitis   COMPARISON: None. Correlation is made with the 11/09/2024 radiographs.   ACCESSION NUMBER(S): [id number]   ORDERING CLINICIAN: KEAGAN CARO   TECHNIQUE: Multiplanar multisequence MRI of the    right forefoot was performed without and with intravenous gadolinium based contrast.   FINDINGS: OSSEOUS STRUCTURES AND JOINTS:   There is  dorsal subluxation of the 2nd digit at the metatarsophalangeal joint. There is mild subluxation at the 3rd digit metatarsophalangeal joint. In the interval since the radiographs there has been amputation of the 1st ray at the level of the head of the 1st metatarsal including the hallux sesamoid bones. There is some mild increased T2 signal intensity in the distal 1st metatarsal at the amputation margin with some faint low T1 signal intensity and faint increased T1 signal intensity after the administration of contrast. There is some patchy faint increased STIR signal intensity seen in bones in the midfoot and at the bases of the 2nd and 3rd metatarsals without confluence low T1 signal intensity or macrotrabecular fracture line evident. Bone marrow signal intensity is otherwise within normal limits. Mild degenerative changes seen in the midfoot. Mild degenerative changes seen the 2nd digit and 3rd digit metatarsophalangeal joint.   SOFT TISSUES:   Amputation changes are seen to the muscles tendons and ligaments to the 1st ray. Generalized atrophy is seen in the musculature. Diffuse ill-defined increased T2/STIR signal intensity is seen in the musculature. Reticular increased T2 signal intensity with mild reticular post-contrast enhancement is seen in the subcutaneous tissues greatest on the dorsum of the foot. There is a confluence area of increased T2 signal intensity with decreased T1 signal intensity precontrast within the amputation bed of the 1st ray.  No definitive rim enhancement is evident. There are some foci of susceptibility artifact within the amputation bed likely related to gas from the recent amputation surgery.       1. Amputation of the 1st ray at the level of the head of the 1st metatarsal along with its sesamoid bones. There is some signal changes in the distal 1st metatarsal at the amputation margin most compatible with reactive changes related to the surgery although subtle osteomyelitis is not absolutely excluded. 2. Small volume of fluid and likely gas in the amputation bed which is felt to be secondary to the surgery without you rim enhancement evident. 3. Subluxation dorsally at the 2nd and 3rd digit metatarsophalangeal joints,, greater at the 2nd digit metatarsophalangeal joint. 4. Findings likely representing a combination of cellulitis and lymphedema of the visualized lower extremity. 5. Findings most compatible with ischemic/diabetic myopathy.   Signed by: Johnathon Perry 11/10/2024 2:26 PM Dictation workstation:   [id number]    XR tibia fibula right 2 views    Result Date: 11/9/2024  Interpreted By:  Johnathon Perry, STUDY: XR TIBIA FIBULA RIGHT 2 VIEWS; 11/9/2024 10:19 am   INDICATION: Signs/Symptoms:right foot/leg red and swollen.   COMPARISON: None.   ACCESSION NUMBER(S): [id number]   ORDERING CLINICIAN: DOLORES JADE   TECHNIQUE: Two views of the right tibia and fibula   FINDINGS: No fracture or dislocation is evident. Surgical and degenerative changes seen of the knee. There is calcaneal enthesophyte formation. Varicose veins are seen in the subcutaneous tissues. There is some heterotopic ossification in the calf. Vascular calcifications are also seen in the soft tissues. No soft tissue gas is evident.       As above without osseous injury evident.   Signed by: Johnathon Perry 11/9/2024 10:35 AM Dictation workstation:   [id number]    XR foot right 3+ views    Result Date: 11/9/2024  Interpreted By:  Johnathon Perry, STUDY: Right  foot dated  11/9/2024.   INDICATION: Signs/Symptoms:right foot and Great toe red and swollen   COMPARISON: Radiographs dated 04/02/2023.   ACCESSION NUMBER(S): [id number]   ORDERING CLINICIAN: DOLORES JADE   TECHNIQUE: Three views of the  right foot.   FINDINGS: There is destruction the majority of the tuft and diaphysis of the distal phalanx of the 1st digit with comminuted fragmentation and some destruction of the base.  There is an os naviculare. Degenerative changes seen of the midfoot. There is calcaneal enthesophyte formation.   No ankle joint effusion is evident. Soft tissue swelling is seen of the 1st digit.       Findings most compatible with cellulitis with osteomyelitis with destruction of the distal phalanx of the 1st digit with fragmentation of the proximal portion of the distal phalanx. MRI may be helpful to further characterize extent of process.   MACRO: None   Signed by: Johnathon Perry 11/9/2024 10:34 AM Dictation workstation:   [id number]   Results for orders placed or performed during the hospital encounter of 11/09/24 (from the past 24 hours)   POCT GLUCOSE   Result Value Ref Range    POCT Glucose 161 (H) 74 - 99 mg/dL   POCT GLUCOSE   Result Value Ref Range    POCT Glucose 94 74 - 99 mg/dL   POCT GLUCOSE   Result Value Ref Range    POCT Glucose 187 (H) 74 - 99 mg/dL   CBC   Result Value Ref Range    WBC 9.1 4.4 - 11.3 x10*3/uL    nRBC 0.0 0.0 - 0.0 /100 WBCs    RBC 4.40 (L) 4.50 - 5.90 x10*6/uL    Hemoglobin 13.8 13.5 - 17.5 g/dL    Hematocrit 40.2 (L) 41.0 - 52.0 %    MCV 91 80 - 100 fL    MCH 31.4 26.0 - 34.0 pg    MCHC 34.3 32.0 - 36.0 g/dL    RDW 11.8 11.5 - 14.5 %    Platelets 270 150 - 450 x10*3/uL   Renal function panel   Result Value Ref Range    Glucose 88 74 - 99 mg/dL    Sodium 137 136 - 145 mmol/L    Potassium 3.9 3.5 - 5.3 mmol/L    Chloride 104 98 - 107 mmol/L    Bicarbonate 25 21 - 32 mmol/L    Anion Gap 12 10 - 20 mmol/L    Urea Nitrogen 14 6 - 23 mg/dL    Creatinine 1.02  0.50 - 1.30 mg/dL    eGFR 88 >60 mL/min/1.73m*2    Calcium 8.8 8.6 - 10.3 mg/dL    Phosphorus 4.0 2.5 - 4.9 mg/dL    Albumin 3.2 (L) 3.4 - 5.0 g/dL   Magnesium   Result Value Ref Range    Magnesium 2.09 1.60 - 2.40 mg/dL   Vancomycin   Result Value Ref Range    Vancomycin 28.5 (H) 5.0 - 20.0 ug/mL   POCT GLUCOSE   Result Value Ref Range    POCT Glucose 100 (H) 74 - 99 mg/dL       Assessment/Plan   Assessment & Plan  Osteomyelitis    HTN (hypertension)    Diabetes mellitus, type 2 (Multi)    Obesity    MARGARET (obstructive sleep apnea)     Right hallux osteomyelitis - POD#2 s/p right hallux and first metatarsal head excision  Dressing changed with adaptic, 4x4's, kerlix and ace bandage.  Continue limited WB in post-op shoe.  IV antibiotics per ID.  Awaiting final OR cultures.  Will follow.  OK for discharge home from podiatry perspective once home antibiotics finalized.  Follow-up in wound care center at [place] next week.       Windy Santana DPM

## 2024-11-13 ENCOUNTER — DOCUMENTATION (OUTPATIENT)
Dept: HOME HEALTH SERVICES | Facility: HOME HEALTH | Age: 54
End: 2024-11-13
Payer: COMMERCIAL

## 2024-11-13 LAB
BACTERIA BLD CULT: NORMAL
BACTERIA BLD CULT: NORMAL
BACTERIA SPEC CULT: ABNORMAL
BACTERIA SPEC CULT: ABNORMAL
GRAM STN SPEC: ABNORMAL
GRAM STN SPEC: ABNORMAL

## 2024-11-13 NOTE — HH CARE COORDINATION
Home Care received a Referral for Nursing. We have processed the referral for a Start of Care on 11-14-24, 24-48 HOURS.     If you have any questions or concerns, please feel free to contact us at [phone]. Follow the prompts, enter your five digit zip code, and you will be directed to your care team on [facility].

## 2024-11-14 NOTE — SIGNIFICANT EVENT
Follow Up Phone Call    Outgoing phone call    Spoke to: Twan Carreonnicolebala Relationship:self   Phone number: [phone]      Outcome: contacted patient/ family   No chief complaint on file.         Diagnosis:Not applicable    States his dressing is dry and intact, denies fever or chills.  Bowels are moving.  Feeling better, no further questions.

## 2024-11-15 ENCOUNTER — PATIENT OUTREACH (OUTPATIENT)
Dept: CARE COORDINATION | Facility: CLINIC | Age: 54
End: 2024-11-15
Payer: COMMERCIAL

## 2024-11-18 LAB
LABORATORY COMMENT REPORT: NORMAL
PATH REPORT.FINAL DX SPEC: NORMAL
PATH REPORT.GROSS SPEC: NORMAL
PATH REPORT.RELEVANT HX SPEC: NORMAL
PATH REPORT.TOTAL CANCER: NORMAL

## 2024-11-20 ENCOUNTER — OFFICE VISIT (OUTPATIENT)
Dept: WOUND CARE | Facility: HOSPITAL | Age: 54
End: 2024-11-20
Payer: COMMERCIAL

## 2024-11-20 PROCEDURE — 99213 OFFICE O/P EST LOW 20 MIN: CPT | Mod: 25

## 2024-11-20 PROCEDURE — 11042 DBRDMT SUBQ TIS 1ST 20SQCM/<: CPT | Mod: RT,58

## 2024-11-21 ENCOUNTER — TELEPHONE (OUTPATIENT)
Dept: HOME HEALTH SERVICES | Facility: HOME HEALTH | Age: 54
End: 2024-11-21
Payer: COMMERCIAL

## 2024-11-21 NOTE — TELEPHONE ENCOUNTER
Good morning Dr. Santana - a homecare referral for wound care was sent to [facility] at time of discharge from the hospital and I wanted to let you know that patient has declined services at this time stating that he is able to do the wound care himself. I see there is an appointment with you on 11.27.24 - if at that time, patient would like HC, a new referral can be sent in at that time. Thank you!

## 2024-11-27 ENCOUNTER — OFFICE VISIT (OUTPATIENT)
Dept: WOUND CARE | Facility: HOSPITAL | Age: 54
End: 2024-11-27
Payer: COMMERCIAL

## 2024-11-27 PROCEDURE — 11042 DBRDMT SUBQ TIS 1ST 20SQCM/<: CPT | Mod: RT

## 2024-12-04 ENCOUNTER — OFFICE VISIT (OUTPATIENT)
Dept: WOUND CARE | Facility: HOSPITAL | Age: 54
End: 2024-12-04
Payer: COMMERCIAL

## 2024-12-04 PROCEDURE — 99212 OFFICE O/P EST SF 10 MIN: CPT

## (undated) DEVICE — PREP TRAY, SKIN, DRY, W/GLOVES

## (undated) DEVICE — SUTURE, PROLENE, 3-0, 18 IN, PS2, BLUE

## (undated) DEVICE — Device

## (undated) DEVICE — CUFF, TOURNIQUET, 18 X 4, DUAL PORT/SNGL BLADDER, DISP, LF

## (undated) DEVICE — CONTAINER, SPECIMEN, 120ML

## (undated) DEVICE — BLADE, SAW, SAGITTAL, MICRO, FLAT, FINE, 9.5 X 25.5 X 0.4 MM, STAINLESS STEEL, STERILE

## (undated) DEVICE — GOWN, SURGICAL, IMPLT, BACK, DISPOSABLE, XLARGE, STERILE

## (undated) DEVICE — SUTURE, VICRYL, 3-0, 27 IN, SH

## (undated) DEVICE — CAUTERY, PENCIL, PUSH BUTTON, SMOKE EVAC, 70MM